# Patient Record
Sex: FEMALE | Race: WHITE | Employment: OTHER | URBAN - METROPOLITAN AREA
[De-identification: names, ages, dates, MRNs, and addresses within clinical notes are randomized per-mention and may not be internally consistent; named-entity substitution may affect disease eponyms.]

---

## 2017-12-10 PROBLEM — I21.4 MI, ACUTE, NON ST SEGMENT ELEVATION (HCC): Status: ACTIVE | Noted: 2017-12-10

## 2017-12-11 ENCOUNTER — HOSPITAL ENCOUNTER (INPATIENT)
Age: 82
LOS: 9 days | Discharge: NURSING, REHAB OR LONG-TERM CARE | DRG: 233 | End: 2017-12-20
Attending: INTERNAL MEDICINE | Admitting: INTERNAL MEDICINE
Payer: MEDICARE

## 2017-12-11 PROBLEM — E78.00 PURE HYPERCHOLESTEROLEMIA: Chronic | Status: ACTIVE | Noted: 2017-12-11

## 2017-12-11 PROBLEM — R07.9 CHEST PAIN: Status: ACTIVE | Noted: 2017-12-11

## 2017-12-11 LAB
ANION GAP SERPL CALCULATED.3IONS-SCNC: 12 MMOL/L (ref 9–17)
BUN BLDV-MCNC: 12 MG/DL (ref 8–23)
BUN/CREAT BLD: ABNORMAL (ref 9–20)
CALCIUM SERPL-MCNC: 8.5 MG/DL (ref 8.6–10.4)
CHLORIDE BLD-SCNC: 103 MMOL/L (ref 98–107)
CO2: 24 MMOL/L (ref 20–31)
CREAT SERPL-MCNC: 0.83 MG/DL (ref 0.5–0.9)
EKG ATRIAL RATE: 72 BPM
EKG P-R INTERVAL: 222 MS
EKG Q-T INTERVAL: 412 MS
EKG QRS DURATION: 50 MS
EKG QTC CALCULATION (BAZETT): 451 MS
EKG R AXIS: -9 DEGREES
EKG T AXIS: -160 DEGREES
EKG VENTRICULAR RATE: 72 BPM
GFR AFRICAN AMERICAN: >60 ML/MIN
GFR NON-AFRICAN AMERICAN: >60 ML/MIN
GFR SERPL CREATININE-BSD FRML MDRD: ABNORMAL ML/MIN/{1.73_M2}
GFR SERPL CREATININE-BSD FRML MDRD: ABNORMAL ML/MIN/{1.73_M2}
GLUCOSE BLD-MCNC: 118 MG/DL (ref 70–99)
HCT VFR BLD CALC: 44.2 % (ref 36.3–47.1)
HEMOGLOBIN: 13.9 G/DL (ref 11.9–15.1)
MAGNESIUM: 2.3 MG/DL (ref 1.6–2.6)
MCH RBC QN AUTO: 31.4 PG (ref 25.2–33.5)
MCHC RBC AUTO-ENTMCNC: 31.4 G/DL (ref 28.4–34.8)
MCV RBC AUTO: 100 FL (ref 82.6–102.9)
PARTIAL THROMBOPLASTIN TIME: 41.3 SEC (ref 21.3–31.3)
PARTIAL THROMBOPLASTIN TIME: 48.8 SEC (ref 21.3–31.3)
PARTIAL THROMBOPLASTIN TIME: 52.4 SEC (ref 21.3–31.3)
PARTIAL THROMBOPLASTIN TIME: 70.7 SEC (ref 21.3–31.3)
PDW BLD-RTO: 13 % (ref 11.8–14.4)
PLATELET # BLD: 176 K/UL (ref 138–453)
PMV BLD AUTO: 11.1 FL (ref 8.1–13.5)
POTASSIUM SERPL-SCNC: 5.3 MMOL/L (ref 3.7–5.3)
RBC # BLD: 4.42 M/UL (ref 3.95–5.11)
SODIUM BLD-SCNC: 139 MMOL/L (ref 135–144)
TROPONIN INTERP: ABNORMAL
TROPONIN INTERP: ABNORMAL
TROPONIN T: 4.25 NG/ML
TROPONIN T: 5.21 NG/ML
WBC # BLD: 9.1 K/UL (ref 3.5–11.3)

## 2017-12-11 PROCEDURE — 2500000003 HC RX 250 WO HCPCS: Performed by: NURSE PRACTITIONER

## 2017-12-11 PROCEDURE — 85027 COMPLETE CBC AUTOMATED: CPT

## 2017-12-11 PROCEDURE — 85730 THROMBOPLASTIN TIME PARTIAL: CPT

## 2017-12-11 PROCEDURE — 6360000002 HC RX W HCPCS: Performed by: NURSE PRACTITIONER

## 2017-12-11 PROCEDURE — 83735 ASSAY OF MAGNESIUM: CPT

## 2017-12-11 PROCEDURE — 94762 N-INVAS EAR/PLS OXIMTRY CONT: CPT

## 2017-12-11 PROCEDURE — 99223 1ST HOSP IP/OBS HIGH 75: CPT | Performed by: INTERNAL MEDICINE

## 2017-12-11 PROCEDURE — 6370000000 HC RX 637 (ALT 250 FOR IP): Performed by: NURSE PRACTITIONER

## 2017-12-11 PROCEDURE — 93005 ELECTROCARDIOGRAM TRACING: CPT

## 2017-12-11 PROCEDURE — 2060000000 HC ICU INTERMEDIATE R&B

## 2017-12-11 PROCEDURE — 36415 COLL VENOUS BLD VENIPUNCTURE: CPT

## 2017-12-11 PROCEDURE — 80048 BASIC METABOLIC PNL TOTAL CA: CPT

## 2017-12-11 PROCEDURE — 36620 INSERTION CATHETER ARTERY: CPT

## 2017-12-11 PROCEDURE — 84484 ASSAY OF TROPONIN QUANT: CPT

## 2017-12-11 RX ORDER — MAGNESIUM SULFATE 1 G/100ML
1 INJECTION INTRAVENOUS PRN
Status: DISCONTINUED | OUTPATIENT
Start: 2017-12-11 | End: 2017-12-13

## 2017-12-11 RX ORDER — DEXTROSE, SODIUM CHLORIDE, AND POTASSIUM CHLORIDE 5; .45; .15 G/100ML; G/100ML; G/100ML
INJECTION INTRAVENOUS CONTINUOUS
Status: DISCONTINUED | OUTPATIENT
Start: 2017-12-11 | End: 2017-12-13

## 2017-12-11 RX ORDER — NITROGLYCERIN 20 MG/100ML
5 INJECTION INTRAVENOUS CONTINUOUS
Status: DISCONTINUED | OUTPATIENT
Start: 2017-12-11 | End: 2017-12-13

## 2017-12-11 RX ORDER — ACETAMINOPHEN 325 MG/1
650 TABLET ORAL EVERY 4 HOURS PRN
Status: DISCONTINUED | OUTPATIENT
Start: 2017-12-11 | End: 2017-12-12

## 2017-12-11 RX ORDER — SODIUM CHLORIDE 0.9 % (FLUSH) 0.9 %
10 SYRINGE (ML) INJECTION EVERY 12 HOURS SCHEDULED
Status: DISCONTINUED | OUTPATIENT
Start: 2017-12-11 | End: 2017-12-13

## 2017-12-11 RX ORDER — FAMOTIDINE 20 MG/1
20 TABLET, FILM COATED ORAL 2 TIMES DAILY
Status: DISCONTINUED | OUTPATIENT
Start: 2017-12-11 | End: 2017-12-13

## 2017-12-11 RX ORDER — ASPIRIN 81 MG/1
324 TABLET, CHEWABLE ORAL ONCE
Status: DISCONTINUED | OUTPATIENT
Start: 2017-12-11 | End: 2017-12-12

## 2017-12-11 RX ORDER — MORPHINE SULFATE 2 MG/ML
2 INJECTION, SOLUTION INTRAMUSCULAR; INTRAVENOUS
Status: DISCONTINUED | OUTPATIENT
Start: 2017-12-11 | End: 2017-12-13

## 2017-12-11 RX ORDER — POTASSIUM CHLORIDE 20MEQ/15ML
40 LIQUID (ML) ORAL PRN
Status: DISCONTINUED | OUTPATIENT
Start: 2017-12-11 | End: 2017-12-13

## 2017-12-11 RX ORDER — HEPARIN SODIUM 10000 [USP'U]/100ML
12 INJECTION, SOLUTION INTRAVENOUS CONTINUOUS
Status: DISCONTINUED | OUTPATIENT
Start: 2017-12-11 | End: 2017-12-13

## 2017-12-11 RX ORDER — POTASSIUM CHLORIDE 7.45 MG/ML
10 INJECTION INTRAVENOUS PRN
Status: DISCONTINUED | OUTPATIENT
Start: 2017-12-11 | End: 2017-12-13

## 2017-12-11 RX ORDER — HYDROCODONE BITARTRATE AND ACETAMINOPHEN 5; 325 MG/1; MG/1
1 TABLET ORAL EVERY 4 HOURS PRN
Status: DISCONTINUED | OUTPATIENT
Start: 2017-12-11 | End: 2017-12-13

## 2017-12-11 RX ORDER — NITROGLYCERIN 0.4 MG/1
0.4 TABLET SUBLINGUAL EVERY 5 MIN PRN
Status: DISCONTINUED | OUTPATIENT
Start: 2017-12-11 | End: 2017-12-13

## 2017-12-11 RX ORDER — SODIUM CHLORIDE 0.9 % (FLUSH) 0.9 %
10 SYRINGE (ML) INJECTION PRN
Status: DISCONTINUED | OUTPATIENT
Start: 2017-12-11 | End: 2017-12-13

## 2017-12-11 RX ORDER — MORPHINE SULFATE 4 MG/ML
4 INJECTION, SOLUTION INTRAMUSCULAR; INTRAVENOUS
Status: DISCONTINUED | OUTPATIENT
Start: 2017-12-11 | End: 2017-12-13

## 2017-12-11 RX ORDER — HEPARIN SODIUM 1000 [USP'U]/ML
2000 INJECTION, SOLUTION INTRAVENOUS; SUBCUTANEOUS PRN
Status: DISCONTINUED | OUTPATIENT
Start: 2017-12-11 | End: 2017-12-13

## 2017-12-11 RX ORDER — HYDROCODONE BITARTRATE AND ACETAMINOPHEN 5; 325 MG/1; MG/1
2 TABLET ORAL EVERY 4 HOURS PRN
Status: DISCONTINUED | OUTPATIENT
Start: 2017-12-11 | End: 2017-12-13

## 2017-12-11 RX ORDER — ONDANSETRON 2 MG/ML
4 INJECTION INTRAMUSCULAR; INTRAVENOUS EVERY 6 HOURS PRN
Status: DISCONTINUED | OUTPATIENT
Start: 2017-12-11 | End: 2017-12-13

## 2017-12-11 RX ORDER — SIMVASTATIN 20 MG
20 TABLET ORAL NIGHTLY
Status: DISCONTINUED | OUTPATIENT
Start: 2017-12-11 | End: 2017-12-13

## 2017-12-11 RX ORDER — POTASSIUM CHLORIDE 20 MEQ/1
40 TABLET, EXTENDED RELEASE ORAL PRN
Status: DISCONTINUED | OUTPATIENT
Start: 2017-12-11 | End: 2017-12-13

## 2017-12-11 RX ORDER — HEPARIN SODIUM 1000 [USP'U]/ML
4000 INJECTION, SOLUTION INTRAVENOUS; SUBCUTANEOUS PRN
Status: DISCONTINUED | OUTPATIENT
Start: 2017-12-11 | End: 2017-12-13

## 2017-12-11 RX ADMIN — HEPARIN SODIUM 2000 UNITS: 1000 INJECTION, SOLUTION INTRAVENOUS; SUBCUTANEOUS at 11:05

## 2017-12-11 RX ADMIN — ACETAMINOPHEN 650 MG: 325 TABLET ORAL at 10:25

## 2017-12-11 RX ADMIN — NITROGLYCERIN 5 MCG/MIN: 20 INJECTION INTRAVENOUS at 09:44

## 2017-12-11 RX ADMIN — NITROGLYCERIN 2.5 MCG/MIN: 20 INJECTION INTRAVENOUS at 09:58

## 2017-12-11 RX ADMIN — HEPARIN SODIUM AND DEXTROSE 9 UNITS/KG/HR: 10000; 5 INJECTION INTRAVENOUS at 11:06

## 2017-12-11 RX ADMIN — SIMVASTATIN 20 MG: 20 TABLET, FILM COATED ORAL at 20:41

## 2017-12-11 RX ADMIN — METOPROLOL TARTRATE 25 MG: 25 TABLET ORAL at 20:41

## 2017-12-11 RX ADMIN — HEPARIN SODIUM 2000 UNITS: 1000 INJECTION, SOLUTION INTRAVENOUS; SUBCUTANEOUS at 18:11

## 2017-12-11 RX ADMIN — HEPARIN SODIUM AND DEXTROSE 11 UNITS/KG/HR: 10000; 5 INJECTION INTRAVENOUS at 18:08

## 2017-12-11 RX ADMIN — FAMOTIDINE 20 MG: 20 TABLET, FILM COATED ORAL at 09:44

## 2017-12-11 RX ADMIN — FAMOTIDINE 20 MG: 20 TABLET, FILM COATED ORAL at 20:41

## 2017-12-11 RX ADMIN — ACETAMINOPHEN 650 MG: 325 TABLET ORAL at 05:45

## 2017-12-11 ASSESSMENT — PAIN SCALES - GENERAL
PAINLEVEL_OUTOF10: 0
PAINLEVEL_OUTOF10: 0
PAINLEVEL_OUTOF10: 7
PAINLEVEL_OUTOF10: 0
PAINLEVEL_OUTOF10: 6

## 2017-12-11 NOTE — H&P
drug history on file. Family History:     History reviewed. No pertinent family history. Review of Systems:     Positive and Negative as described in HPI. CONSTITUTIONAL:  negative for fevers, chills, sweats, fatigue, weight loss  HEENT:  negative for vision, hearing changes, runny nose, throat pain  RESPIRATORY:  negative for shortness of breath, cough, congestion, wheezing. CARDIOVASCULAR:  negative for palpitations or pedal edema. GASTROINTESTINAL:  negative for nausea, vomiting, diarrhea, constipation, change in bowel habits, abdominal pain   GENITOURINARY:  negative for difficulty of urination, burning with urination, frequency   INTEGUMENT:  negative for rash, skin lesions, easy bruising   HEMATOLOGIC/LYMPHATIC:  negative for swelling/edema   ALLERGIC/IMMUNOLOGIC:  negative for urticaria , itching  ENDOCRINE:  negative increase in drinking, increase in urination, hot or cold intolerance  MUSCULOSKELETAL:  negative joint pains, muscle aches, swelling of joints  NEUROLOGICAL:  negative for headaches, dizziness, lightheadedness, numbness, pain, tingling extremities  BEHAVIOR/PSYCH:  negative for depression, anxiety    Physical Exam:   BP (!) 103/54   Pulse 74   Temp 97.9 °F (36.6 °C) (Oral)   Resp 16   Ht 5' (1.524 m)   Wt 182 lb 15.7 oz (83 kg)   SpO2 96%   BMI 35.74 kg/m²   Temp (24hrs), Av.6 °F (36.4 °C), Min:97.3 °F (36.3 °C), Max:97.9 °F (36.6 °C)    No results for input(s): POCGLU in the last 72 hours. Intake/Output Summary (Last 24 hours) at 17 1222  Last data filed at 17 0630   Gross per 24 hour   Intake              135 ml   Output              200 ml   Net              -65 ml       General Appearance:  alert, well appearing, and in no acute distress  Mental status: oriented to person, place, and time with normal affect  Head:  normocephalic, atraumatic.   Eye: no icterus, redness, pupils equal and reactive, extraocular eye movements intact, conjunctiva clear  Ear: Comment          GFR Staging NOT REPORTED    Magnesium    Collection Time: 12/11/17  1:54 AM   Result Value Ref Range    Magnesium 2.3 1.6 - 2.6 mg/dL   APTT    Collection Time: 12/11/17  7:07 AM   Result Value Ref Range    PTT 48.8 (H) 21.3 - 31.3 sec   Troponin    Collection Time: 12/11/17  7:07 AM   Result Value Ref Range    Troponin T 4.25 (HH) <0.03 ng/mL    Troponin Interp         EKG 12 Lead    Collection Time: 12/11/17 11:54 AM   Result Value Ref Range    Ventricular Rate 72 BPM    Atrial Rate 72 BPM    P-R Interval 222 ms    QRS Duration 50 ms    Q-T Interval 412 ms    QTc Calculation (Bazett) 451 ms    R Axis -9 degrees    T Axis -160 degrees       Assessment :      Primary Problem  MI, acute, non ST segment elevation [facility])    Active Hospital Problems    Diagnosis Date Noted    Chest pain [R07.9] 12/11/2017    Pure hypercholesterolemia [E78.00] 12/11/2017    MI, acute, non ST segment elevation ([facility] [address] [I21.4] 12/10/2017       Plan:     Patient status Admit as inpatient in the  Cardiovascular ICU    1. Keep NPO. 2. Continue Heparin ggt. Nitro as needed for chest pain. 3. Get rpt EKG. On monitor has T wave inversions and occasional PVCs. 4. A/w cardiology eval. Nurse to verify if [facility] received consult last night. 5. Labs to evaluate her other risk factors. Consultations:   IP CONSULT TO CARDIOLOGY     Patient is admitted as inpatient status because of co-morbidities listed above, severity of signs and symptoms as outlined, requirement for current medical therapies and most importantly because of direct risk to patient if care not provided in a hospital setting.     Eliana Sosa MD  12/11/2017  12:22 PM    Copy sent to Dr. Kingsley Arriaga DO

## 2017-12-11 NOTE — CONSULTS
Cardiology Consult           Date of Admission:  12/11/2017  Date of Consultation:  12/11/2017      PCP:  Johnny Watson DO      Chief Complaint: chest pain    History of Present Illness:  Sushant Calvin is a [de-identified] y.o. female who presents with chest pain and positive troponin. EKG initially concerning for STEMI and hyperacute T waves. Patient states that pain began after grocery shopping and she was carrying in her groceries and she noticed substernal pain with radiation across her back. She initially went to [facility] and was found to have positive trops. She then was transferred here to [facility] for further management and has been pain free since being here. Work up at [facility] included a PE protocol CT which was negative. PMH:   has no past medical history on file. PSH:   has a past surgical history that includes joint replacement.     Allergies:  No Known Allergies     Home Meds:    Prior to Admission medications    Not on [address]:    Current Facility-Administered Medications   Medication Dose Route Frequency Provider Last Rate Last Dose    aspirin chewable tablet 324 mg  324 mg Oral Once Robe Jj NP        Luh Ear ON 12/12/2017] aspirin EC tablet 325 mg  325 mg Oral Daily Robe jJ NP        heparin (porcine) injection 2,000 Units  2,000 Units Intravenous PRN Robe Jj NP   2,000 Units at 12/11/17 1105    heparin (porcine) injection 4,000 Units  4,000 Units Intravenous PRN Robe Jj NP        heparin 25,000 units in dextrose 5% 250 mL infusion  12 Units/kg/hr Intravenous Continuous Robe Jj NP 7.5 mL/hr at 12/11/17 1106 9 Units/kg/hr at 12/11/17 1106    metoprolol tartrate (LOPRESSOR) tablet 25 mg  25 mg Oral BID Robe Jj NP        nitroGLYCERIN (NITROSTAT) SL tablet 0.4 mg  0.4 mg Sublingual Q5 Min PRN Robe Jj NP        simvastatin (ZOCOR) tablet 20 mg  20 mg Oral Nightly Pippa Canales NP        acetaminophen (TYLENOL) tablet 650 mg  650 mg Oral Q4H PRN Pippa Canales NP   650 mg at 12/11/17 1025    dextrose 5 % and 0.45 % NaCl with KCl 20 mEq infusion   Intravenous Continuous Pippa Canales NP   Stopped at 12/11/17 0433    famotidine (PEPCID) tablet 20 mg  20 mg Oral BID Pippa Canales NP   20 mg at 12/11/17 0944    HYDROcodone-acetaminophen (NORCO) 5-325 MG per tablet 1 tablet  1 tablet Oral Q4H PRN Pippa Canales NP        Or    HYDROcodone-acetaminophen (NORCO) 5-325 MG per tablet 2 tablet  2 tablet Oral Q4H PRN Pippa Canales NP        magnesium hydroxide (MILK OF MAGNESIA) 400 MG/5ML suspension 30 mL  30 mL Oral Daily PRN Pippa Canales NP        magnesium sulfate 1 g in dextrose 5% 100 mL IVPB  1 g Intravenous PRN Pippa Canales NP        morphine injection 2 mg  2 mg Intravenous Q2H PRN Pippa Canales NP        Or   Douglas Jacobs morphine (PF) injection 4 mg  4 mg Intravenous Q2H PRN Pippa Canales NP        [facility]ron [facility]) injection 4 mg  4 mg Intravenous Q6H PRN Pippa Canales NP        potassium chloride (KLOR-CON M) extended release tablet 40 mEq  40 mEq Oral PRN Pippa Canales NP        Or    potassium chloride 20 MEQ/15ML (10%) oral solution 40 mEq  40 mEq Oral PRN Pippa Canales NP        Or    potassium chloride 10 mEq/100 mL IVPB (Peripheral Line)  10 mEq Intravenous PRN Pippa Canales NP        sodium chloride flush 0.9 % injection 10 mL  10 mL Intravenous 2 times per day Pippa Canales NP        sodium chloride flush 0.9 % injection 10 mL  10 mL Intravenous PRN HOWIE Wesley Crome ON 12/12/2017] pneumococcal 13-valent conjugate (PREVNAR) injection 0.5 mL  0.5 mL Intramuscular Once Kushal Qureshi MD        [START ON 12/12/2017] influenza quadrivalent split vaccine (1.524 m)   Wt 182 lb 15.7 oz (83 kg)   SpO2 96%   BMI 35.74 kg/m²        Admission Weight: 182 lb 15.7 oz (83 kg)     General appearance: Awake, Alert Cooperative    Head: Normocephalic, without obvious abnormality, atraumatic    Eyes: Conjunctivae/corneas clear. PERRL, EOM's intact. Fundi benign    Neck: no adenopathy, no carotid bruit, no JVD, supple, symmetrical, trachea midline and thyroid: not enlarged, symmetric, no tenderness/mass/nodules    Lungs: clear to auscultation bilaterally    Heart: regular rate and rhythm, S1, S2 normal, no murmur, click, rub or gallop    Abdomen: Soft, non-tender. Bowel sounds normal. No masses,  no organomegaly    Extremities: extremities normal, atraumatic, no cyanosis or edema    Skin: Skin color, texture, turgor normal. No rashes or lesions    Neurologic: Grossly normal            Labs:      CBC:   Recent Labs      12/11/17 0154   WBC  9.1   HGB  13.9   HCT  44.2   MCV  100.0   PLT  176     BMP:   Recent Labs      12/11/17   0154   NA  139   K  5.3   CL  103   CO2  24   BUN  12   CREATININE  0.83     PT/INR: No results for input(s): PROTIME, INR in the last 72 hours. APTT:   Recent Labs      12/11/17   0154  12/11/17   0707   APTT  52.4*  48.8*     MAG:   Recent Labs      12/11/17   0154   MG  2.3     D Dimer: No results for input(s): DDIMER in the last 72 hours. Troponin T   Recent Labs      12/11/17   0154  12/11/17   0707   TROPONINT  5.21*  4.25*     ProBNP Invalid input(s): PRO-BNP          Diagnosis:  Principal Problem:    MI, acute, non ST segment elevation (HCC)  Active Problems:    Chest pain    Pure hypercholesterolemia          Plan: Will plan for heart catheterization, r/b/a discussed and wishes to proceed. Will continue medical management overnight and cath tomorrow given recent contrast load with CT.       Electronically signed by Tom Kumar MD on 12/11/2017 at 1:27 PM

## 2017-12-11 NOTE — PROGRESS NOTES
Smoking Cessation - topics covered   []  Health Risks  []  Benefits of Quitting   []  Smoking Cessation  [x]  Patient has no history of tobacco use  []  Patient is former smoker. [x]  No need for tobacco cessation education. []  Booklet given  []  Patient verbalizes understanding. []  Patient denies need for tobacco cessation education.   Issac Cheema  11:09 AM

## 2017-12-12 ENCOUNTER — APPOINTMENT (OUTPATIENT)
Dept: CARDIAC CATH/INVASIVE PROCEDURES | Age: 82
DRG: 233 | End: 2017-12-12
Attending: INTERNAL MEDICINE
Payer: MEDICARE

## 2017-12-12 PROBLEM — R07.9 CHEST PAIN: Status: RESOLVED | Noted: 2017-12-11 | Resolved: 2017-12-12

## 2017-12-12 PROBLEM — R41.0 INTERMITTENT CONFUSION: Status: ACTIVE | Noted: 2017-12-12

## 2017-12-12 LAB
ABSOLUTE EOS #: 0.08 K/UL (ref 0–0.44)
ABSOLUTE IMMATURE GRANULOCYTE: <0.03 K/UL (ref 0–0.3)
ABSOLUTE LYMPH #: 1.25 K/UL (ref 1.1–3.7)
ABSOLUTE MONO #: 0.74 K/UL (ref 0.1–1.2)
ALBUMIN SERPL-MCNC: 3.6 G/DL (ref 3.5–5.2)
ALBUMIN/GLOBULIN RATIO: 1.3 (ref 1–2.5)
ALP BLD-CCNC: 50 U/L (ref 35–104)
ALT SERPL-CCNC: 22 U/L (ref 5–33)
ANION GAP SERPL CALCULATED.3IONS-SCNC: 10 MMOL/L (ref 9–17)
AST SERPL-CCNC: 77 U/L
BASOPHILS # BLD: 1 % (ref 0–2)
BASOPHILS ABSOLUTE: 0.04 K/UL (ref 0–0.2)
BILIRUB SERPL-MCNC: 0.5 MG/DL (ref 0.3–1.2)
BLOOD BANK SPECIMEN: NORMAL
BNP INTERPRETATION: ABNORMAL
BUN BLDV-MCNC: 9 MG/DL (ref 8–23)
BUN/CREAT BLD: ABNORMAL (ref 9–20)
CALCIUM SERPL-MCNC: 8.4 MG/DL (ref 8.6–10.4)
CHLORIDE BLD-SCNC: 106 MMOL/L (ref 98–107)
CHOLESTEROL/HDL RATIO: 2.5
CHOLESTEROL: 196 MG/DL
CO2: 26 MMOL/L (ref 20–31)
CREAT SERPL-MCNC: 0.77 MG/DL (ref 0.5–0.9)
DIFFERENTIAL TYPE: ABNORMAL
EOSINOPHILS RELATIVE PERCENT: 1 % (ref 1–4)
ESTIMATED AVERAGE GLUCOSE: 117 MG/DL
GFR AFRICAN AMERICAN: >60 ML/MIN
GFR NON-AFRICAN AMERICAN: >60 ML/MIN
GFR SERPL CREATININE-BSD FRML MDRD: ABNORMAL ML/MIN/{1.73_M2}
GFR SERPL CREATININE-BSD FRML MDRD: ABNORMAL ML/MIN/{1.73_M2}
GLUCOSE BLD-MCNC: 125 MG/DL (ref 70–99)
HBA1C MFR BLD: 5.7 % (ref 4–6)
HCT VFR BLD CALC: 43.9 % (ref 36.3–47.1)
HDLC SERPL-MCNC: 80 MG/DL
HEMOGLOBIN: 14.4 G/DL (ref 11.9–15.1)
IMMATURE GRANULOCYTES: 0 %
INR BLD: 1
LDL CHOLESTEROL: 103 MG/DL (ref 0–130)
LV EF: 35 %
LVEF MODALITY: NORMAL
LYMPHOCYTES # BLD: 14 % (ref 24–43)
MCH RBC QN AUTO: 31.9 PG (ref 25.2–33.5)
MCHC RBC AUTO-ENTMCNC: 32.8 G/DL (ref 28.4–34.8)
MCV RBC AUTO: 97.3 FL (ref 82.6–102.9)
MONOCYTES # BLD: 8 % (ref 3–12)
PARTIAL THROMBOPLASTIN TIME: 24.7 SEC (ref 21.3–31.3)
PARTIAL THROMBOPLASTIN TIME: 61.9 SEC (ref 21.3–31.3)
PDW BLD-RTO: 13.2 % (ref 11.8–14.4)
PLATELET # BLD: 164 K/UL (ref 138–453)
PLATELET ESTIMATE: ABNORMAL
PMV BLD AUTO: 11.6 FL (ref 8.1–13.5)
POTASSIUM SERPL-SCNC: 4.7 MMOL/L (ref 3.7–5.3)
PRO-BNP: 5915 PG/ML
PROTHROMBIN TIME: 10.5 SEC (ref 9.4–12.6)
RBC # BLD: 4.51 M/UL (ref 3.95–5.11)
RBC # BLD: ABNORMAL 10*6/UL
SEG NEUTROPHILS: 76 % (ref 36–65)
SEGMENTED NEUTROPHILS ABSOLUTE COUNT: 6.71 K/UL (ref 1.5–8.1)
SODIUM BLD-SCNC: 142 MMOL/L (ref 135–144)
TOTAL PROTEIN: 6.3 G/DL (ref 6.4–8.3)
TRIGL SERPL-MCNC: 63 MG/DL
VLDLC SERPL CALC-MCNC: NORMAL MG/DL (ref 1–30)
WBC # BLD: 8.8 K/UL (ref 3.5–11.3)
WBC # BLD: ABNORMAL 10*3/UL

## 2017-12-12 PROCEDURE — C1725 CATH, TRANSLUMIN NON-LASER: HCPCS

## 2017-12-12 PROCEDURE — 87641 MR-STAPH DNA AMP PROBE: CPT

## 2017-12-12 PROCEDURE — 36415 COLL VENOUS BLD VENIPUNCTURE: CPT

## 2017-12-12 PROCEDURE — B2151ZZ FLUOROSCOPY OF LEFT HEART USING LOW OSMOLAR CONTRAST: ICD-10-PCS | Performed by: INTERNAL MEDICINE

## 2017-12-12 PROCEDURE — 80061 LIPID PANEL: CPT

## 2017-12-12 PROCEDURE — 93880 EXTRACRANIAL BILAT STUDY: CPT

## 2017-12-12 PROCEDURE — 2580000003 HC RX 258: Performed by: NURSE PRACTITIONER

## 2017-12-12 PROCEDURE — B2111ZZ FLUOROSCOPY OF MULTIPLE CORONARY ARTERIES USING LOW OSMOLAR CONTRAST: ICD-10-PCS | Performed by: INTERNAL MEDICINE

## 2017-12-12 PROCEDURE — C1760 CLOSURE DEV, VASC: HCPCS

## 2017-12-12 PROCEDURE — 6370000000 HC RX 637 (ALT 250 FOR IP): Performed by: NURSE PRACTITIONER

## 2017-12-12 PROCEDURE — 85730 THROMBOPLASTIN TIME PARTIAL: CPT

## 2017-12-12 PROCEDURE — 86901 BLOOD TYPING SEROLOGIC RH(D): CPT

## 2017-12-12 PROCEDURE — 86920 COMPATIBILITY TEST SPIN: CPT

## 2017-12-12 PROCEDURE — 6370000000 HC RX 637 (ALT 250 FOR IP): Performed by: INTERNAL MEDICINE

## 2017-12-12 PROCEDURE — 93306 TTE W/DOPPLER COMPLETE: CPT

## 2017-12-12 PROCEDURE — 94762 N-INVAS EAR/PLS OXIMTRY CONT: CPT

## 2017-12-12 PROCEDURE — C1894 INTRO/SHEATH, NON-LASER: HCPCS

## 2017-12-12 PROCEDURE — 85025 COMPLETE CBC W/AUTO DIFF WBC: CPT

## 2017-12-12 PROCEDURE — 80053 COMPREHEN METABOLIC PANEL: CPT

## 2017-12-12 PROCEDURE — 6360000002 HC RX W HCPCS: Performed by: NURSE PRACTITIONER

## 2017-12-12 PROCEDURE — 93970 EXTREMITY STUDY: CPT

## 2017-12-12 PROCEDURE — 2060000000 HC ICU INTERMEDIATE R&B

## 2017-12-12 PROCEDURE — 99222 1ST HOSP IP/OBS MODERATE 55: CPT | Performed by: INTERNAL MEDICINE

## 2017-12-12 PROCEDURE — 86900 BLOOD TYPING SEROLOGIC ABO: CPT

## 2017-12-12 PROCEDURE — 86850 RBC ANTIBODY SCREEN: CPT

## 2017-12-12 PROCEDURE — 6370000000 HC RX 637 (ALT 250 FOR IP): Performed by: PHYSICIAN ASSISTANT

## 2017-12-12 PROCEDURE — 4A023N7 MEASUREMENT OF CARDIAC SAMPLING AND PRESSURE, LEFT HEART, PERCUTANEOUS APPROACH: ICD-10-PCS | Performed by: INTERNAL MEDICINE

## 2017-12-12 PROCEDURE — C1769 GUIDE WIRE: HCPCS

## 2017-12-12 PROCEDURE — 83880 ASSAY OF NATRIURETIC PEPTIDE: CPT

## 2017-12-12 PROCEDURE — 83036 HEMOGLOBIN GLYCOSYLATED A1C: CPT

## 2017-12-12 PROCEDURE — 93458 L HRT ARTERY/VENTRICLE ANGIO: CPT | Performed by: INTERNAL MEDICINE

## 2017-12-12 PROCEDURE — 85610 PROTHROMBIN TIME: CPT

## 2017-12-12 RX ORDER — ASPIRIN 81 MG/1
81 TABLET ORAL DAILY
Status: DISCONTINUED | OUTPATIENT
Start: 2017-12-13 | End: 2017-12-13

## 2017-12-12 RX ORDER — SODIUM CHLORIDE 450 MG/100ML
INJECTION, SOLUTION INTRAVENOUS CONTINUOUS
Status: ACTIVE | OUTPATIENT
Start: 2017-12-12 | End: 2017-12-12

## 2017-12-12 RX ORDER — ACETAMINOPHEN 325 MG/1
650 TABLET ORAL EVERY 4 HOURS PRN
Status: DISCONTINUED | OUTPATIENT
Start: 2017-12-12 | End: 2017-12-13

## 2017-12-12 RX ORDER — SODIUM CHLORIDE 0.9 % (FLUSH) 0.9 %
10 SYRINGE (ML) INJECTION PRN
Status: DISCONTINUED | OUTPATIENT
Start: 2017-12-12 | End: 2017-12-12

## 2017-12-12 RX ORDER — QUETIAPINE FUMARATE 25 MG/1
25 TABLET, FILM COATED ORAL NIGHTLY
Status: DISCONTINUED | OUTPATIENT
Start: 2017-12-12 | End: 2017-12-13

## 2017-12-12 RX ORDER — ONDANSETRON 2 MG/ML
4 INJECTION INTRAMUSCULAR; INTRAVENOUS EVERY 6 HOURS PRN
Status: DISCONTINUED | OUTPATIENT
Start: 2017-12-12 | End: 2017-12-12

## 2017-12-12 RX ORDER — SODIUM CHLORIDE 0.9 % (FLUSH) 0.9 %
10 SYRINGE (ML) INJECTION EVERY 12 HOURS SCHEDULED
Status: DISCONTINUED | OUTPATIENT
Start: 2017-12-12 | End: 2017-12-13

## 2017-12-12 RX ADMIN — QUETIAPINE FUMARATE 25 MG: 25 TABLET ORAL at 20:35

## 2017-12-12 RX ADMIN — Medication 10 ML: at 20:36

## 2017-12-12 RX ADMIN — SIMVASTATIN 20 MG: 20 TABLET, FILM COATED ORAL at 20:35

## 2017-12-12 RX ADMIN — ACETAMINOPHEN 650 MG: 325 TABLET ORAL at 15:10

## 2017-12-12 RX ADMIN — HEPARIN SODIUM AND DEXTROSE 11 UNITS/KG/HR: 10000; 5 INJECTION INTRAVENOUS at 00:09

## 2017-12-12 RX ADMIN — FAMOTIDINE 20 MG: 20 TABLET, FILM COATED ORAL at 20:35

## 2017-12-12 RX ADMIN — METOPROLOL TARTRATE 25 MG: 25 TABLET ORAL at 20:35

## 2017-12-12 ASSESSMENT — ENCOUNTER SYMPTOMS
ABDOMINAL PAIN: 0
COLOR CHANGE: 0
SORE THROAT: 0
COUGH: 0
SHORTNESS OF BREATH: 0
CHEST TIGHTNESS: 0
VOMITING: 1
DIARRHEA: 1
BACK PAIN: 1
ABDOMINAL DISTENTION: 0

## 2017-12-12 ASSESSMENT — PAIN SCALES - GENERAL: PAINLEVEL_OUTOF10: 3

## 2017-12-12 NOTE — CONSULTS
Chief Complaint:  Chest pain on Sunday (12/10/17)    HPI:  Ms. Jeanna Worley is a [de-identified]y.o. year-old  female who was transferred from [facility] with elevated troponins and further workup yesterday. Pt denies any significant past medical history. Per pt she had substernal chest pain after returning from grocery shopping. It was nonradiating, lasting for few hours which made her go to [facility]. Associated with back pain, vomiting and diarrhea. Denies any diaphoresis, SOB, fatigue, dizziness syncope, palpitations. Denies any similar CP in the past. She was given morphine and nitro drip at [facility] with relief of symptoms. Had elevated trops and no ST elevation on EKG. Her CT chest was negative for PE per history. She also has c/o pedal edema on and off for long time for which she takes \"water pill\". She had cardiac cath today and was found to have MVD. She has been referred for consideration of CABG. PMH:   has a past medical history of NSTEMI (non-ST elevated myocardial infarction) ([facility] [address] (12/11/2017). denies HTN, HLP, DM, thyroid, kidney, liver issues. denies any h/o strokes, Mi, rib fractures, varicose veins, vein stripping, PAD  PSH:   has a past surgical history that includes joint replacement; Cardiac catheterization (12/12/2017); and Cataract removal (Bilateral).   Allergies:  No Known Allergies  Medications:    Current Facility-Administered Medications:     sodium chloride flush 0.9 % injection 10 mL, 10 mL, Intravenous, 2 times per day, Radha Padilla MD    acetaminophen (TYLENOL) tablet 650 mg, 650 mg, Oral, Q4H PRN, Radha Padilla MD, 650 mg at 12/12/17 1510    0.45 % sodium chloride infusion, , Intravenous, Continuous, Radha Padilla MD    aspirin chewable tablet 324 mg, 324 mg, Oral, Once, Robe Jj NP    aspirin EC tablet 325 mg, 325 mg, Oral, Daily, Robe Jj NP, Stopped at 12/12/17 0928    heparin (porcine) injection 2,000 Units, 2,000 Units, Intravenous, PRN, Hollie Wells NP, 2,000 Units at 12/11/17 1811    heparin (porcine) injection 4,000 Units, 4,000 Units, Intravenous, PRN, Hollie Wells NP    heparin 25,000 units in dextrose 5% 250 mL infusion, 12 Units/kg/hr, Intravenous, Continuous, Hollie Wells NP, Last Rate: 9.1 mL/hr at 12/12/17 0927, 11 Units/kg/hr at 12/12/17 0927    metoprolol tartrate (LOPRESSOR) tablet 25 mg, 25 mg, Oral, BID, Hollie Wells NP, Stopped at 12/12/17 9937    nitroGLYCERIN (NITROSTAT) SL tablet 0.4 mg, 0.4 mg, Sublingual, Q5 Min PRN, Hollie Wells NP    simvastatin (ZOCOR) tablet 20 mg, 20 mg, Oral, Nightly, Hollie Wells NP, 20 mg at 12/11/17 2041    dextrose 5 % and 0.45 % NaCl with KCl 20 mEq infusion, , Intravenous, Continuous, Hollie Minks, NP, Stopped at 12/11/17 0433    famotidine (PEPCID) tablet 20 mg, 20 mg, Oral, BID, Hollie Wells NP, Stopped at 12/12/17 0928    HYDROcodone-acetaminophen (NORCO) 5-325 MG per tablet 1 tablet, 1 tablet, Oral, Q4H PRN **OR** HYDROcodone-acetaminophen (NORCO) 5-325 MG per tablet 2 tablet, 2 tablet, Oral, Q4H PRN, Hollie Wells NP    magnesium hydroxide (MILK OF MAGNESIA) 400 MG/5ML suspension 30 mL, 30 mL, Oral, Daily PRN, Hollie Wells NP    magnesium sulfate 1 g in dextrose 5% 100 mL IVPB, 1 g, Intravenous, PRN, Hollie Wells NP    morphine injection 2 mg, 2 mg, Intravenous, Q2H PRN **OR** morphine (PF) injection 4 mg, 4 mg, Intravenous, Q2H PRN, Hollie Wells NP    ondansetron [facility]) injection 4 mg, 4 mg, Intravenous, Q6H PRN, Hollie Welsl NP    potassium chloride (KLOR-CON M) extended release tablet 40 mEq, 40 mEq, Oral, PRN **OR** potassium chloride 20 MEQ/15ML (10%) oral solution 40 mEq, 40 mEq, Oral, PRN **OR** potassium chloride 10 mEq/100 mL IVPB (Peripheral Line), 10 mEq, Intravenous, PRN, Hollie Wells NP    sodium chloride flush 0.9 % injection 10 mL, 10 mL, Intravenous, 2 times per day, Mami Rucker NP    sodium chloride flush 0.9 % injection 10 mL, 10 mL, Intravenous, PRN, Mami Rucker NP    pneumococcal 13-valent conjugate (PREVNAR) injection 0.5 mL, 0.5 mL, Intramuscular, Once, Cass Madden MD    influenza quadrivalent split vaccine (FLUZONE;FLUARIX;FLULAVAL;AFLURIA) injection 0.5 mL, 0.5 mL, Intramuscular, Once, Cass Madden MD    nitroGLYCERIN 50 mg in dextrose 5% 250 mL infusion, 5 mcg/min, Intravenous, Continuous, Mami Rucker NP, Stopped at 12/11/17 1057    Social Hx:     reports that she has never smoked. She does not have any smokeless tobacco history on file. no ETOH, drugs. Lives by self. Family Hx:  Mother- emphysema, few of her 6 siblings have \"heart problems because they smoke a lot\"    ROS:    Review of Systems   Constitutional: Negative for chills, diaphoresis, fatigue and fever. HENT: Negative for congestion and sore throat. Eyes: Negative for visual disturbance. Respiratory: Negative for cough, chest tightness and shortness of breath. Cardiovascular: Positive for chest pain and leg swelling. Negative for palpitations. Gastrointestinal: Positive for diarrhea and vomiting. Negative for abdominal distention and abdominal pain. Genitourinary: Negative for dysuria, frequency and urgency. Musculoskeletal: Positive for back pain. Skin: Negative for color change, pallor and rash. Neurological: Negative for dizziness, syncope and numbness. Psychiatric/Behavioral: Negative for behavioral problems and confusion. The patient is not nervous/anxious. Physical Examination    Vitals:  Vitals:    12/12/17 1500   BP: (!) 138/56   Pulse: 92   Resp: 18   Temp:    SpO2: 92%     Physical Exam   Constitutional: She is oriented to person, place, and time. She appears well-developed and well-nourished. No distress. HENT:   Head: Normocephalic and atraumatic. Eyes: EOM are normal. Pupils are equal, round, and reactive to light. Neck: Normal range of motion. Neck supple. No JVD present. No tracheal deviation present. Cardiovascular: Normal rate, regular rhythm and normal heart sounds. Exam reveals no gallop and no friction rub. No murmur heard. Pulmonary/Chest: Effort normal and breath sounds normal. No respiratory distress. She has no wheezes. She has no rales. Abdominal: Soft. Bowel sounds are normal. She exhibits no distension. There is no tenderness. Musculoskeletal: She exhibits edema. Neurological: She is alert and oriented to person, place, and time. Skin: Skin is warm and dry. She is not diaphoretic. No erythema. No pallor. Psychiatric: She has a normal mood and affect. Her behavior is normal. Judgment and thought content normal.       Imaging Studies: Cardiac workup has revealed   Cardiac Cath: 12/12/17  LMCA: 40-50% distal lesion    LAD: 90% ostial lesion  95% mid lesion  90% ostial diagonal lesion <2.0mm vessel    LCx: 50-60% mid lesion    RCA: Normal 0% stenosis. Ramus: 70-80% ostial Ramus lesion     Coronary Tree      Dominance: Right     LV Analysis  LV function assessed as:Abnormal.  Ejection Fraction  +----------------------------------------------------------------------+---+  ! Method                                                                !EF%! +----------------------------------------------------------------------+---+  ! LV gram                                                               !25 ! +----------------------------------------------------------------------+---+  Echo: pending      Assessment:  In summary, Ms. Maribel Marks is a [de-identified]y.o. year-old female with NSTEMI have MVD CAD, pt on heparin gtt with therapeutic PTT. Nitro gtt stopped, on BB and ASA, pt currently asymptomatic. Recommendation:  I believe Ms. Maribel Marks would benefit optimally from [facility] CABG    I have discussed the proposed procedure, along with its risk,

## 2017-12-12 NOTE — PLAN OF CARE
Problem: Pain:  Goal: Control of acute pain  Control of acute pain   Outcome: Ongoing  Pt had no complaint of chest pain this shift.

## 2017-12-12 NOTE — PROGRESS NOTES
Wendy Hartman [facility]    Progress Note    12/12/2017    8:50 AM    Name:   Brittni Ernst  MRN:     [id number]     Acct:      [de-identified]   Room:   [address] [place] [address] Day:  1  Admit Date:  12/11/2017  1:30 AM    PCP:   Sonia Salcido DO  Code Status:  Full Code    Subjective:     C/C: Chest pain . Interval History Status: improved. Per RN, overnight patient had trouble with orientation. She was apparently aware she was at [facility] but kept calling for her dog, trying to get out of bed. Has a sitter in room with her now. Remains free of chest pain/SOB/Cough. Brief History:   [de-identified] y.o. Non-/non  female with no significant PMH presents with chest pain that started yesterday after a trip to grocery store. Substernal with radiation over the entire chest 6-8/10 severe, 'pressure like' . Lasted for 4-5 hrs after which she decided to drive herself to [facility] .   CT chest showed no PE, +b/l mediastinal lymphadenopathy. Since Troponin was trending up she was transferred here this morning. Trop max >40. Trops trended down. Chest pain resolved 12/11 and off nitro drip. Continues to be on Heparin ggt . Cath today per cardiology. Review of Systems:     Constitutional:  negative for chills, fevers, sweats  Respiratory:  negative for cough, dyspnea on exertion, hemoptysis, shortness of breath, wheezing  Cardiovascular:  negative for chest pain, chest pressure/discomfort, lower extremity edema, palpitations  Gastrointestinal:  negative for abdominal pain, constipation, diarrhea, nausea, vomiting  Neurological:  negative for dizziness, headache.+confusion overnight. Medications:      Allergies:  No Known Allergies    Current Meds:   Scheduled Meds:    aspirin  324 mg Oral Once    aspirin  325 mg Oral Daily    metoprolol tartrate  25 mg Oral BID    simvastatin  20 mg Oral Nightly    famotidine  20 mg Oral BID    sodium chloride

## 2017-12-12 NOTE — PROGRESS NOTES
Patient admitted, consent signed and questions answered. Patient ready for procedure. Call light to reach with side rails up 2 of 2. Bilat groin hair clipped. Aid/sitter at bedside with patient. Awaiting daughter. Patient oriented x4.

## 2017-12-12 NOTE — FLOWSHEET NOTE
· Facts: Pt here reportedly b/c of chest pain & elevated troponins. Reportedly will go for cardiac cath tomorrow. · Feelings: Pt calm & experiencing no pain when  saw her while rounding. Pt said that she is ready for possibility of having stents placed. · Family: Pt is  and has grown children. She also has grandchildren and living siblings. She says that she was  about 2 yrs ago & since that time has lived alone. · Deandra: Pt describes herself as [facility] and a \"member\" of [employer]) in [address] [place] [address] said that she has not been very regular in attendance @ services since the death of her . Pt was open to 's presence and accepted his offer of prayer. · Follow-up: Chaplains will remain available to offer spiritual and emotional support as needed. Rev. Ramila Rae, [address]     12/11/17 5110   Encounter Summary   Services provided to: Patient   Referral/Consult From: [facility] Children;Family members   Place [facility] ([facility] [address] [facility])   Continue Visiting (12/11)   Complexity of Encounter Moderate   Length of Encounter 15 minutes   Spiritual Assessment Completed Yes   Routine   Type Initial  (pt will have cardiac cath tomorrow)   Assessment Calm; Approachable;Coping   Intervention Sustaining presence/ Ministry of presence; Active listening;Explored feelings, thoughts, concerns;Explored coping resources; Discussed belief system/Hindu practices/deandra;Prayer  (left Spiritual Care info)   Outcome Receptive; Acceptance;Encouraged;Coping;Engaged in conversation;Comfort;Expressed gratitude   Spiritual/Congregation   Type Spiritual support

## 2017-12-13 ENCOUNTER — APPOINTMENT (OUTPATIENT)
Dept: GENERAL RADIOLOGY | Age: 82
DRG: 233 | End: 2017-12-13
Attending: INTERNAL MEDICINE
Payer: MEDICARE

## 2017-12-13 ENCOUNTER — ANESTHESIA (OUTPATIENT)
Dept: OPERATING ROOM | Age: 82
DRG: 233 | End: 2017-12-13
Payer: MEDICARE

## 2017-12-13 ENCOUNTER — ANESTHESIA EVENT (OUTPATIENT)
Dept: OPERATING ROOM | Age: 82
DRG: 233 | End: 2017-12-13
Payer: MEDICARE

## 2017-12-13 VITALS
RESPIRATION RATE: 11 BRPM | OXYGEN SATURATION: 99 % | DIASTOLIC BLOOD PRESSURE: 72 MMHG | SYSTOLIC BLOOD PRESSURE: 115 MMHG | TEMPERATURE: 96 F

## 2017-12-13 PROBLEM — I25.10 CAD, MULTIPLE VESSEL: Status: ACTIVE | Noted: 2017-12-13

## 2017-12-13 LAB
-: NORMAL
ALLEN TEST: ABNORMAL
ALLEN TEST: NORMAL
ALLEN TEST: NORMAL
AMORPHOUS: NORMAL
ANION GAP SERPL CALCULATED.3IONS-SCNC: 13 MMOL/L (ref 9–17)
ANION GAP: 10 MMOL/L (ref 7–16)
BACTERIA: NORMAL
BILIRUBIN URINE: NEGATIVE
BUN BLDV-MCNC: 11 MG/DL (ref 8–23)
BUN/CREAT BLD: ABNORMAL (ref 9–20)
CALCIUM SERPL-MCNC: 8 MG/DL (ref 8.6–10.4)
CASTS UA: NORMAL /LPF (ref 0–8)
CHLORIDE BLD-SCNC: 110 MMOL/L (ref 98–107)
CO2: 24 MMOL/L (ref 20–31)
COLOR: YELLOW
COMMENT UA: ABNORMAL
CREAT SERPL-MCNC: 0.73 MG/DL (ref 0.5–0.9)
CRYSTALS, UA: NORMAL /HPF
CULTURE: NORMAL
EKG ATRIAL RATE: 78 BPM
EKG P AXIS: 66 DEGREES
EKG P-R INTERVAL: 200 MS
EKG Q-T INTERVAL: 402 MS
EKG QRS DURATION: 62 MS
EKG QTC CALCULATION (BAZETT): 458 MS
EKG R AXIS: 2 DEGREES
EKG T AXIS: 116 DEGREES
EKG VENTRICULAR RATE: 78 BPM
EPITHELIAL CELLS UA: NORMAL /HPF (ref 0–5)
FIO2: 60
FIO2: ABNORMAL
FIO2: NORMAL
GFR AFRICAN AMERICAN: >60 ML/MIN
GFR NON-AFRICAN AMERICAN: >60 ML/MIN
GFR NON-AFRICAN AMERICAN: >60 ML/MIN
GFR SERPL CREATININE-BSD FRML MDRD: >60 ML/MIN
GFR SERPL CREATININE-BSD FRML MDRD: ABNORMAL ML/MIN/{1.73_M2}
GFR SERPL CREATININE-BSD FRML MDRD: ABNORMAL ML/MIN/{1.73_M2}
GFR SERPL CREATININE-BSD FRML MDRD: NORMAL ML/MIN/{1.73_M2}
GLUCOSE BLD-MCNC: 111 MG/DL (ref 65–105)
GLUCOSE BLD-MCNC: 113 MG/DL (ref 65–105)
GLUCOSE BLD-MCNC: 128 MG/DL (ref 74–100)
GLUCOSE BLD-MCNC: 129 MG/DL (ref 70–99)
GLUCOSE BLD-MCNC: 137 MG/DL (ref 74–100)
GLUCOSE BLD-MCNC: 155 MG/DL (ref 74–100)
GLUCOSE BLD-MCNC: 190 MG/DL (ref 74–100)
GLUCOSE BLD-MCNC: 213 MG/DL (ref 74–100)
GLUCOSE BLD-MCNC: 86 MG/DL (ref 65–105)
GLUCOSE BLD-MCNC: 94 MG/DL (ref 74–100)
GLUCOSE URINE: NEGATIVE
HCT VFR BLD CALC: 22.6 % (ref 36.3–47.1)
HEMOGLOBIN: 7.3 G/DL (ref 11.9–15.1)
KETONES, URINE: NEGATIVE
LEUKOCYTE ESTERASE, URINE: ABNORMAL
Lab: NORMAL
MAGNESIUM: 1.4 MG/DL (ref 1.6–2.6)
MCH RBC QN AUTO: 32 PG (ref 25.2–33.5)
MCHC RBC AUTO-ENTMCNC: 32.3 G/DL (ref 28.4–34.8)
MCV RBC AUTO: 99.1 FL (ref 82.6–102.9)
MODE: ABNORMAL
MODE: NORMAL
MODE: NORMAL
MRSA, DNA, NASAL: NORMAL
MUCUS: NORMAL
NEGATIVE BASE EXCESS, ART: 1 (ref 0–2)
NEGATIVE BASE EXCESS, ART: 1 (ref 0–2)
NEGATIVE BASE EXCESS, ART: 6 (ref 0–2)
NEGATIVE BASE EXCESS, ART: 6 (ref 0–2)
NEGATIVE BASE EXCESS, ART: ABNORMAL (ref 0–2)
NEGATIVE BASE EXCESS, ART: NORMAL (ref 0–2)
NITRITE, URINE: NEGATIVE
O2 DEVICE/FLOW/%: ABNORMAL
O2 DEVICE/FLOW/%: NORMAL
O2 DEVICE/FLOW/%: NORMAL
OTHER OBSERVATIONS UA: NORMAL
PATIENT TEMP: ABNORMAL
PATIENT TEMP: NORMAL
PATIENT TEMP: NORMAL
PDW BLD-RTO: 13.1 % (ref 11.8–14.4)
PH UA: 6 (ref 5–8)
PLATELET # BLD: 143 K/UL (ref 138–453)
PLATELET # BLD: ABNORMAL K/UL (ref 138–453)
PLATELET, FLUORESCENCE: 66 K/UL (ref 138–453)
PLATELET, IMMATURE FRACTION: 5.7 % (ref 1.1–10.3)
PMV BLD AUTO: ABNORMAL FL (ref 8.1–13.5)
POC CHLORIDE: 109 MMOL/L (ref 98–107)
POC CREATININE: 0.66 MG/DL (ref 0.51–1.19)
POC HCO3: 20.3 MMOL/L (ref 21–28)
POC HCO3: 20.8 MMOL/L (ref 21–28)
POC HCO3: 22.6 MMOL/L (ref 21–28)
POC HCO3: 24.1 MMOL/L (ref 21–28)
POC HCO3: 24.6 MMOL/L (ref 21–28)
POC HCO3: 27.5 MMOL/L (ref 21–28)
POC HEMATOCRIT: 23 % (ref 36–46)
POC HEMATOCRIT: 24 % (ref 36–46)
POC HEMATOCRIT: 27 % (ref 36–46)
POC HEMATOCRIT: 28 % (ref 36–46)
POC HEMATOCRIT: 38 % (ref 36–46)
POC HEMOGLOBIN: 12.8 G/DL (ref 12–16)
POC HEMOGLOBIN: 7.8 G/DL (ref 12–16)
POC HEMOGLOBIN: 8.1 G/DL (ref 12–16)
POC HEMOGLOBIN: 9.2 G/DL (ref 12–16)
POC HEMOGLOBIN: 9.5 G/DL (ref 12–16)
POC IONIZED CALCIUM: 1.13 MMOL/L (ref 1.15–1.33)
POC IONIZED CALCIUM: 1.2 MMOL/L (ref 1.15–1.33)
POC IONIZED CALCIUM: 1.31 MMOL/L (ref 1.15–1.33)
POC IONIZED CALCIUM: 1.37 MMOL/L (ref 1.15–1.33)
POC IONIZED CALCIUM: 1.74 MMOL/L (ref 1.15–1.33)
POC LACTIC ACID: 4.68 MMOL/L (ref 0.56–1.39)
POC O2 SATURATION: 100 % (ref 94–98)
POC O2 SATURATION: 100 % (ref 94–98)
POC O2 SATURATION: 97 % (ref 94–98)
POC O2 SATURATION: 97 % (ref 94–98)
POC O2 SATURATION: 99 % (ref 94–98)
POC O2 SATURATION: 99 % (ref 94–98)
POC PCO2 TEMP: ABNORMAL MM HG
POC PCO2 TEMP: NORMAL MM HG
POC PCO2 TEMP: NORMAL MM HG
POC PCO2: 30.1 MM HG (ref 35–48)
POC PCO2: 40.3 MM HG (ref 35–48)
POC PCO2: 41.2 MM HG (ref 35–48)
POC PCO2: 42.7 MM HG (ref 35–48)
POC PCO2: 42.8 MM HG (ref 35–48)
POC PCO2: 43 MM HG (ref 35–48)
POC PH TEMP: ABNORMAL
POC PH TEMP: NORMAL
POC PH TEMP: NORMAL
POC PH: 7.29 (ref 7.35–7.45)
POC PH: 7.31 (ref 7.35–7.45)
POC PH: 7.37 (ref 7.35–7.45)
POC PH: 7.38 (ref 7.35–7.45)
POC PH: 7.42 (ref 7.35–7.45)
POC PH: 7.49 (ref 7.35–7.45)
POC PO2 TEMP: ABNORMAL MM HG
POC PO2 TEMP: NORMAL MM HG
POC PO2 TEMP: NORMAL MM HG
POC PO2: 136.8 MM HG (ref 83–108)
POC PO2: 149 MM HG (ref 83–108)
POC PO2: 222.6 MM HG (ref 83–108)
POC PO2: 270.2 MM HG (ref 83–108)
POC PO2: 86 MM HG (ref 83–108)
POC PO2: 89.8 MM HG (ref 83–108)
POC POTASSIUM: 3 MMOL/L (ref 3.5–4.5)
POC POTASSIUM: 3.3 MMOL/L (ref 3.5–4.5)
POC POTASSIUM: 3.3 MMOL/L (ref 3.5–4.5)
POC POTASSIUM: 3.6 MMOL/L (ref 3.5–4.5)
POC POTASSIUM: 4.1 MMOL/L (ref 3.5–4.5)
POC SODIUM: 144 MMOL/L (ref 138–146)
POSITIVE BASE EXCESS, ART: 0 (ref 0–3)
POSITIVE BASE EXCESS, ART: 3 (ref 0–3)
POSITIVE BASE EXCESS, ART: ABNORMAL (ref 0–3)
POSITIVE BASE EXCESS, ART: NORMAL (ref 0–3)
POTASSIUM SERPL-SCNC: 3.3 MMOL/L (ref 3.7–5.3)
POTASSIUM SERPL-SCNC: 5 MMOL/L (ref 3.7–5.3)
PROTEIN UA: NEGATIVE
RBC # BLD: 2.28 M/UL (ref 3.95–5.11)
RBC UA: NORMAL /HPF (ref 0–4)
RENAL EPITHELIAL, UA: NORMAL /HPF
SAMPLE SITE: ABNORMAL
SAMPLE SITE: NORMAL
SAMPLE SITE: NORMAL
SODIUM BLD-SCNC: 147 MMOL/L (ref 135–144)
SPECIFIC GRAVITY UA: 1.04 (ref 1–1.03)
SPECIMEN DESCRIPTION: NORMAL
SPECIMEN DESCRIPTION: NORMAL
STATUS: NORMAL
TCO2 (CALC), ART: 22 MMOL/L (ref 22–29)
TCO2 (CALC), ART: 22 MMOL/L (ref 22–29)
TCO2 (CALC), ART: 24 MMOL/L (ref 22–29)
TCO2 (CALC), ART: 25 MMOL/L (ref 22–29)
TCO2 (CALC), ART: 26 MMOL/L (ref 22–29)
TCO2 (CALC), ART: 29 MMOL/L (ref 22–29)
TRICHOMONAS: NORMAL
TURBIDITY: ABNORMAL
URINE HGB: NEGATIVE
UROBILINOGEN, URINE: NORMAL
WBC # BLD: 18.9 K/UL (ref 3.5–11.3)
WBC UA: NORMAL /HPF (ref 0–5)
YEAST: NORMAL

## 2017-12-13 PROCEDURE — 81001 URINALYSIS AUTO W/SCOPE: CPT

## 2017-12-13 PROCEDURE — 83605 ASSAY OF LACTIC ACID: CPT

## 2017-12-13 PROCEDURE — 2500000003 HC RX 250 WO HCPCS

## 2017-12-13 PROCEDURE — P9045 ALBUMIN (HUMAN), 5%, 250 ML: HCPCS

## 2017-12-13 PROCEDURE — 83735 ASSAY OF MAGNESIUM: CPT

## 2017-12-13 PROCEDURE — P9037 PLATE PHERES LEUKOREDU IRRAD: HCPCS

## 2017-12-13 PROCEDURE — 85027 COMPLETE CBC AUTOMATED: CPT

## 2017-12-13 PROCEDURE — S0028 INJECTION, FAMOTIDINE, 20 MG: HCPCS | Performed by: THORACIC SURGERY (CARDIOTHORACIC VASCULAR SURGERY)

## 2017-12-13 PROCEDURE — 84132 ASSAY OF SERUM POTASSIUM: CPT

## 2017-12-13 PROCEDURE — 36415 COLL VENOUS BLD VENIPUNCTURE: CPT

## 2017-12-13 PROCEDURE — P9045 ALBUMIN (HUMAN), 5%, 250 ML: HCPCS | Performed by: NURSE ANESTHETIST, CERTIFIED REGISTERED

## 2017-12-13 PROCEDURE — A4648 IMPLANTABLE TISSUE MARKER: HCPCS | Performed by: THORACIC SURGERY (CARDIOTHORACIC VASCULAR SURGERY)

## 2017-12-13 PROCEDURE — 6360000002 HC RX W HCPCS: Performed by: NURSE ANESTHETIST, CERTIFIED REGISTERED

## 2017-12-13 PROCEDURE — 6360000002 HC RX W HCPCS

## 2017-12-13 PROCEDURE — 3700000000 HC ANESTHESIA ATTENDED CARE: Performed by: THORACIC SURGERY (CARDIOTHORACIC VASCULAR SURGERY)

## 2017-12-13 PROCEDURE — C1729 CATH, DRAINAGE: HCPCS | Performed by: THORACIC SURGERY (CARDIOTHORACIC VASCULAR SURGERY)

## 2017-12-13 PROCEDURE — 71020 XR CHEST STANDARD TWO VW: CPT

## 2017-12-13 PROCEDURE — 02100Z9 BYPASS CORONARY ARTERY, ONE ARTERY FROM LEFT INTERNAL MAMMARY, OPEN APPROACH: ICD-10-PCS | Performed by: THORACIC SURGERY (CARDIOTHORACIC VASCULAR SURGERY)

## 2017-12-13 PROCEDURE — C1875 STENT, COATED/COV W/O DEL SY: HCPCS | Performed by: THORACIC SURGERY (CARDIOTHORACIC VASCULAR SURGERY)

## 2017-12-13 PROCEDURE — 6360000002 HC RX W HCPCS: Performed by: NURSE PRACTITIONER

## 2017-12-13 PROCEDURE — 6370000000 HC RX 637 (ALT 250 FOR IP): Performed by: NURSE PRACTITIONER

## 2017-12-13 PROCEDURE — 93005 ELECTROCARDIOGRAM TRACING: CPT

## 2017-12-13 PROCEDURE — 87086 URINE CULTURE/COLONY COUNT: CPT

## 2017-12-13 PROCEDURE — 3600000008 HC SURGERY OHS BASE: Performed by: THORACIC SURGERY (CARDIOTHORACIC VASCULAR SURGERY)

## 2017-12-13 PROCEDURE — 87186 SC STD MICRODIL/AGAR DIL: CPT

## 2017-12-13 PROCEDURE — 2580000003 HC RX 258: Performed by: NURSE ANESTHETIST, CERTIFIED REGISTERED

## 2017-12-13 PROCEDURE — 6370000000 HC RX 637 (ALT 250 FOR IP): Performed by: NURSE ANESTHETIST, CERTIFIED REGISTERED

## 2017-12-13 PROCEDURE — 87088 URINE BACTERIA CULTURE: CPT

## 2017-12-13 PROCEDURE — 2500000003 HC RX 250 WO HCPCS: Performed by: NURSE ANESTHETIST, CERTIFIED REGISTERED

## 2017-12-13 PROCEDURE — P9016 RBC LEUKOCYTES REDUCED: HCPCS

## 2017-12-13 PROCEDURE — 2580000003 HC RX 258: Performed by: THORACIC SURGERY (CARDIOTHORACIC VASCULAR SURGERY)

## 2017-12-13 PROCEDURE — 2100000001 HC CVICU R&B

## 2017-12-13 PROCEDURE — 33519 CABG ARTERY-VEIN THREE: CPT | Performed by: THORACIC SURGERY (CARDIOTHORACIC VASCULAR SURGERY)

## 2017-12-13 PROCEDURE — 2720000010 HC SURG SUPPLY STERILE: Performed by: THORACIC SURGERY (CARDIOTHORACIC VASCULAR SURGERY)

## 2017-12-13 PROCEDURE — 3600000018 HC SURGERY OHS ADDTL 15MIN: Performed by: THORACIC SURGERY (CARDIOTHORACIC VASCULAR SURGERY)

## 2017-12-13 PROCEDURE — 3700000001 HC ADD 15 MINUTES (ANESTHESIA): Performed by: THORACIC SURGERY (CARDIOTHORACIC VASCULAR SURGERY)

## 2017-12-13 PROCEDURE — A6450 LT COMPRES BAND >=5"/YD: HCPCS | Performed by: THORACIC SURGERY (CARDIOTHORACIC VASCULAR SURGERY)

## 2017-12-13 PROCEDURE — 82803 BLOOD GASES ANY COMBINATION: CPT

## 2017-12-13 PROCEDURE — 021209W BYPASS CORONARY ARTERY, THREE ARTERIES FROM AORTA WITH AUTOLOGOUS VENOUS TISSUE, OPEN APPROACH: ICD-10-PCS | Performed by: THORACIC SURGERY (CARDIOTHORACIC VASCULAR SURGERY)

## 2017-12-13 PROCEDURE — 2500000003 HC RX 250 WO HCPCS: Performed by: THORACIC SURGERY (CARDIOTHORACIC VASCULAR SURGERY)

## 2017-12-13 PROCEDURE — 82565 ASSAY OF CREATININE: CPT

## 2017-12-13 PROCEDURE — 6370000000 HC RX 637 (ALT 250 FOR IP): Performed by: THORACIC SURGERY (CARDIOTHORACIC VASCULAR SURGERY)

## 2017-12-13 PROCEDURE — 33508 ENDOSCOPIC VEIN HARVEST: CPT | Performed by: THORACIC SURGERY (CARDIOTHORACIC VASCULAR SURGERY)

## 2017-12-13 PROCEDURE — 82435 ASSAY OF BLOOD CHLORIDE: CPT

## 2017-12-13 PROCEDURE — 36430 TRANSFUSION BLD/BLD COMPNT: CPT

## 2017-12-13 PROCEDURE — 84295 ASSAY OF SERUM SODIUM: CPT

## 2017-12-13 PROCEDURE — 71010 XR CHEST PORTABLE: CPT

## 2017-12-13 PROCEDURE — 6360000002 HC RX W HCPCS: Performed by: THORACIC SURGERY (CARDIOTHORACIC VASCULAR SURGERY)

## 2017-12-13 PROCEDURE — B24BZZ4 ULTRASONOGRAPHY OF HEART WITH AORTA, TRANSESOPHAGEAL: ICD-10-PCS | Performed by: ANESTHESIOLOGY

## 2017-12-13 PROCEDURE — 2580000003 HC RX 258: Performed by: NURSE PRACTITIONER

## 2017-12-13 PROCEDURE — 33533 CABG ARTERIAL SINGLE: CPT | Performed by: THORACIC SURGERY (CARDIOTHORACIC VASCULAR SURGERY)

## 2017-12-13 PROCEDURE — 85014 HEMATOCRIT: CPT

## 2017-12-13 PROCEDURE — 86900 BLOOD TYPING SEROLOGIC ABO: CPT

## 2017-12-13 PROCEDURE — 99232 SBSQ HOSP IP/OBS MODERATE 35: CPT | Performed by: INTERNAL MEDICINE

## 2017-12-13 PROCEDURE — 82330 ASSAY OF CALCIUM: CPT

## 2017-12-13 PROCEDURE — 6370000000 HC RX 637 (ALT 250 FOR IP): Performed by: PHYSICIAN ASSISTANT

## 2017-12-13 PROCEDURE — 85049 AUTOMATED PLATELET COUNT: CPT

## 2017-12-13 PROCEDURE — 06BQ4ZZ EXCISION OF LEFT SAPHENOUS VEIN, PERCUTANEOUS ENDOSCOPIC APPROACH: ICD-10-PCS | Performed by: THORACIC SURGERY (CARDIOTHORACIC VASCULAR SURGERY)

## 2017-12-13 PROCEDURE — 80048 BASIC METABOLIC PNL TOTAL CA: CPT

## 2017-12-13 PROCEDURE — A6449 LT COMPRES BAND >=3" <5"/YD: HCPCS | Performed by: THORACIC SURGERY (CARDIOTHORACIC VASCULAR SURGERY)

## 2017-12-13 PROCEDURE — 82947 ASSAY GLUCOSE BLOOD QUANT: CPT

## 2017-12-13 PROCEDURE — C1773 RET DEV, INSERTABLE: HCPCS | Performed by: THORACIC SURGERY (CARDIOTHORACIC VASCULAR SURGERY)

## 2017-12-13 DEVICE — U-SHAPED STYLE, SILICONE (1 PER STERILE PKG)
Type: IMPLANTABLE DEVICE | Site: HEART | Status: FUNCTIONAL
Brand: SCANLAN® A/C LOCATOR® GRAFT MARKER

## 2017-12-13 RX ORDER — ESMOLOL HYDROCHLORIDE 10 MG/ML
INJECTION INTRAVENOUS PRN
Status: DISCONTINUED | OUTPATIENT
Start: 2017-12-13 | End: 2017-12-13 | Stop reason: SDUPTHER

## 2017-12-13 RX ORDER — ETOMIDATE 2 MG/ML
INJECTION INTRAVENOUS PRN
Status: DISCONTINUED | OUTPATIENT
Start: 2017-12-13 | End: 2017-12-13 | Stop reason: SDUPTHER

## 2017-12-13 RX ORDER — FENTANYL CITRATE 50 UG/ML
50 INJECTION, SOLUTION INTRAMUSCULAR; INTRAVENOUS
Status: DISCONTINUED | OUTPATIENT
Start: 2017-12-13 | End: 2017-12-20 | Stop reason: HOSPADM

## 2017-12-13 RX ORDER — HYDRALAZINE HYDROCHLORIDE 20 MG/ML
5 INJECTION INTRAMUSCULAR; INTRAVENOUS EVERY 5 MIN PRN
Status: DISCONTINUED | OUTPATIENT
Start: 2017-12-13 | End: 2017-12-20 | Stop reason: HOSPADM

## 2017-12-13 RX ORDER — FENTANYL CITRATE 50 UG/ML
INJECTION, SOLUTION INTRAMUSCULAR; INTRAVENOUS PRN
Status: DISCONTINUED | OUTPATIENT
Start: 2017-12-13 | End: 2017-12-13 | Stop reason: SDUPTHER

## 2017-12-13 RX ORDER — SODIUM CHLORIDE 0.9 % (FLUSH) 0.9 %
10 SYRINGE (ML) INJECTION PRN
Status: DISCONTINUED | OUTPATIENT
Start: 2017-12-13 | End: 2017-12-13

## 2017-12-13 RX ORDER — DIPHENHYDRAMINE HCL 25 MG
25 TABLET ORAL NIGHTLY PRN
Status: DISCONTINUED | OUTPATIENT
Start: 2017-12-14 | End: 2017-12-20 | Stop reason: HOSPADM

## 2017-12-13 RX ORDER — ALBUMIN, HUMAN INJ 5% 5 %
SOLUTION INTRAVENOUS PRN
Status: DISCONTINUED | OUTPATIENT
Start: 2017-12-13 | End: 2017-12-13 | Stop reason: SDUPTHER

## 2017-12-13 RX ORDER — CHLORHEXIDINE GLUCONATE 0.12 MG/ML
15 RINSE ORAL ONCE
Status: DISCONTINUED | OUTPATIENT
Start: 2017-12-13 | End: 2017-12-13 | Stop reason: HOSPADM

## 2017-12-13 RX ORDER — ASPIRIN 81 MG/1
81 TABLET ORAL DAILY
Status: DISCONTINUED | OUTPATIENT
Start: 2017-12-13 | End: 2017-12-20 | Stop reason: HOSPADM

## 2017-12-13 RX ORDER — LIDOCAINE HYDROCHLORIDE 10 MG/ML
INJECTION, SOLUTION INFILTRATION; PERINEURAL PRN
Status: DISCONTINUED | OUTPATIENT
Start: 2017-12-13 | End: 2017-12-13 | Stop reason: SDUPTHER

## 2017-12-13 RX ORDER — MAGNESIUM SULFATE 1 G/100ML
1 INJECTION INTRAVENOUS PRN
Status: DISCONTINUED | OUTPATIENT
Start: 2017-12-13 | End: 2017-12-20 | Stop reason: HOSPADM

## 2017-12-13 RX ORDER — PAPAVERINE HYDROCHLORIDE 30 MG/ML
INJECTION INTRAMUSCULAR; INTRAVENOUS PRN
Status: DISCONTINUED | OUTPATIENT
Start: 2017-12-13 | End: 2017-12-13 | Stop reason: HOSPADM

## 2017-12-13 RX ORDER — DIGOXIN 0.25 MG/ML
500 INJECTION INTRAMUSCULAR; INTRAVENOUS ONCE
Status: COMPLETED | OUTPATIENT
Start: 2017-12-13 | End: 2017-12-13

## 2017-12-13 RX ORDER — MILRINONE LACTATE 0.2 MG/ML
INJECTION, SOLUTION INTRAVENOUS CONTINUOUS PRN
Status: DISCONTINUED | OUTPATIENT
Start: 2017-12-13 | End: 2017-12-13 | Stop reason: SDUPTHER

## 2017-12-13 RX ORDER — AMIODARONE HYDROCHLORIDE 200 MG/1
200 TABLET ORAL 3 TIMES DAILY
Status: DISCONTINUED | OUTPATIENT
Start: 2017-12-13 | End: 2017-12-20 | Stop reason: HOSPADM

## 2017-12-13 RX ORDER — M-VIT,TX,IRON,MINS/CALC/FOLIC 27MG-0.4MG
1 TABLET ORAL
Status: DISCONTINUED | OUTPATIENT
Start: 2017-12-14 | End: 2017-12-20 | Stop reason: HOSPADM

## 2017-12-13 RX ORDER — DEXTROSE MONOHYDRATE 50 MG/ML
100 INJECTION, SOLUTION INTRAVENOUS PRN
Status: DISCONTINUED | OUTPATIENT
Start: 2017-12-13 | End: 2017-12-20 | Stop reason: HOSPADM

## 2017-12-13 RX ORDER — NITROGLYCERIN 20 MG/100ML
INJECTION INTRAVENOUS PRN
Status: DISCONTINUED | OUTPATIENT
Start: 2017-12-13 | End: 2017-12-13 | Stop reason: SDUPTHER

## 2017-12-13 RX ORDER — SODIUM CHLORIDE, SODIUM LACTATE, POTASSIUM CHLORIDE, CALCIUM CHLORIDE 600; 310; 30; 20 MG/100ML; MG/100ML; MG/100ML; MG/100ML
INJECTION, SOLUTION INTRAVENOUS CONTINUOUS PRN
Status: DISCONTINUED | OUTPATIENT
Start: 2017-12-13 | End: 2017-12-13 | Stop reason: SDUPTHER

## 2017-12-13 RX ORDER — ONDANSETRON 2 MG/ML
4 INJECTION INTRAMUSCULAR; INTRAVENOUS EVERY 8 HOURS PRN
Status: DISCONTINUED | OUTPATIENT
Start: 2017-12-13 | End: 2017-12-20 | Stop reason: HOSPADM

## 2017-12-13 RX ORDER — SODIUM CHLORIDE 9 MG/ML
INJECTION, SOLUTION INTRAVENOUS CONTINUOUS
Status: DISCONTINUED | OUTPATIENT
Start: 2017-12-13 | End: 2017-12-15

## 2017-12-13 RX ORDER — MEPERIDINE HYDROCHLORIDE 50 MG/ML
25 INJECTION INTRAMUSCULAR; INTRAVENOUS; SUBCUTANEOUS
Status: ACTIVE | OUTPATIENT
Start: 2017-12-13 | End: 2017-12-13

## 2017-12-13 RX ORDER — VANCOMYCIN HYDROCHLORIDE 1 G/200ML
INJECTION, SOLUTION INTRAVENOUS PRN
Status: DISCONTINUED | OUTPATIENT
Start: 2017-12-13 | End: 2017-12-13 | Stop reason: SDUPTHER

## 2017-12-13 RX ORDER — SODIUM CHLORIDE 0.9 % (FLUSH) 0.9 %
10 SYRINGE (ML) INJECTION EVERY 12 HOURS SCHEDULED
Status: DISCONTINUED | OUTPATIENT
Start: 2017-12-13 | End: 2017-12-13

## 2017-12-13 RX ORDER — PROPOFOL 10 MG/ML
INJECTION, EMULSION INTRAVENOUS CONTINUOUS PRN
Status: DISCONTINUED | OUTPATIENT
Start: 2017-12-13 | End: 2017-12-13 | Stop reason: SDUPTHER

## 2017-12-13 RX ORDER — DEXTROSE MONOHYDRATE 25 G/50ML
12.5 INJECTION, SOLUTION INTRAVENOUS PRN
Status: DISCONTINUED | OUTPATIENT
Start: 2017-12-13 | End: 2017-12-20 | Stop reason: HOSPADM

## 2017-12-13 RX ORDER — ROCURONIUM BROMIDE 10 MG/ML
INJECTION, SOLUTION INTRAVENOUS PRN
Status: DISCONTINUED | OUTPATIENT
Start: 2017-12-13 | End: 2017-12-13 | Stop reason: SDUPTHER

## 2017-12-13 RX ORDER — NICOTINE POLACRILEX 4 MG
15 LOZENGE BUCCAL PRN
Status: DISCONTINUED | OUTPATIENT
Start: 2017-12-13 | End: 2017-12-20 | Stop reason: HOSPADM

## 2017-12-13 RX ORDER — AMIODARONE HYDROCHLORIDE 200 MG/1
200 TABLET ORAL 3 TIMES DAILY
Status: DISCONTINUED | OUTPATIENT
Start: 2017-12-13 | End: 2017-12-13

## 2017-12-13 RX ORDER — METOCLOPRAMIDE HYDROCHLORIDE 5 MG/ML
10 INJECTION INTRAMUSCULAR; INTRAVENOUS EVERY 6 HOURS PRN
Status: DISCONTINUED | OUTPATIENT
Start: 2017-12-13 | End: 2017-12-20 | Stop reason: HOSPADM

## 2017-12-13 RX ORDER — SODIUM CHLORIDE 0.9 % (FLUSH) 0.9 %
10 SYRINGE (ML) INJECTION PRN
Status: DISCONTINUED | OUTPATIENT
Start: 2017-12-13 | End: 2017-12-20 | Stop reason: HOSPADM

## 2017-12-13 RX ORDER — ASPIRIN 81 MG/1
81 TABLET ORAL DAILY
Status: DISCONTINUED | OUTPATIENT
Start: 2017-12-13 | End: 2017-12-13

## 2017-12-13 RX ORDER — SODIUM CHLORIDE 9 MG/ML
INJECTION, SOLUTION INTRAVENOUS CONTINUOUS
Status: DISCONTINUED | OUTPATIENT
Start: 2017-12-13 | End: 2017-12-13

## 2017-12-13 RX ORDER — CHLORHEXIDINE GLUCONATE 0.12 MG/ML
15 RINSE ORAL 2 TIMES DAILY
Status: DISCONTINUED | OUTPATIENT
Start: 2017-12-13 | End: 2017-12-14

## 2017-12-13 RX ORDER — POTASSIUM CHLORIDE 29.8 MG/ML
20 INJECTION INTRAVENOUS PRN
Status: DISCONTINUED | OUTPATIENT
Start: 2017-12-13 | End: 2017-12-20 | Stop reason: HOSPADM

## 2017-12-13 RX ORDER — TRAMADOL HYDROCHLORIDE 50 MG/1
50 TABLET ORAL EVERY 6 HOURS PRN
Status: DISCONTINUED | OUTPATIENT
Start: 2017-12-13 | End: 2017-12-20 | Stop reason: HOSPADM

## 2017-12-13 RX ORDER — CALCIUM CHLORIDE 100 MG/ML
INJECTION INTRAVENOUS; INTRAVENTRICULAR PRN
Status: DISCONTINUED | OUTPATIENT
Start: 2017-12-13 | End: 2017-12-13 | Stop reason: SDUPTHER

## 2017-12-13 RX ORDER — PROTAMINE SULFATE 10 MG/ML
INJECTION, SOLUTION INTRAVENOUS PRN
Status: DISCONTINUED | OUTPATIENT
Start: 2017-12-13 | End: 2017-12-13 | Stop reason: SDUPTHER

## 2017-12-13 RX ORDER — 0.9 % SODIUM CHLORIDE 0.9 %
250 INTRAVENOUS SOLUTION INTRAVENOUS ONCE
Status: DISCONTINUED | OUTPATIENT
Start: 2017-12-13 | End: 2017-12-18

## 2017-12-13 RX ORDER — AMIODARONE HYDROCHLORIDE 50 MG/ML
INJECTION, SOLUTION INTRAVENOUS PRN
Status: DISCONTINUED | OUTPATIENT
Start: 2017-12-13 | End: 2017-12-13 | Stop reason: SDUPTHER

## 2017-12-13 RX ORDER — FUROSEMIDE 10 MG/ML
INJECTION INTRAMUSCULAR; INTRAVENOUS PRN
Status: DISCONTINUED | OUTPATIENT
Start: 2017-12-13 | End: 2017-12-13 | Stop reason: SDUPTHER

## 2017-12-13 RX ORDER — DIGOXIN 0.25 MG/ML
250 INJECTION INTRAMUSCULAR; INTRAVENOUS DAILY
Status: DISCONTINUED | OUTPATIENT
Start: 2017-12-13 | End: 2017-12-15

## 2017-12-13 RX ORDER — SIMVASTATIN 20 MG
20 TABLET ORAL NIGHTLY
Status: DISCONTINUED | OUTPATIENT
Start: 2017-12-14 | End: 2017-12-20 | Stop reason: HOSPADM

## 2017-12-13 RX ORDER — MIDAZOLAM HYDROCHLORIDE 1 MG/ML
INJECTION INTRAMUSCULAR; INTRAVENOUS PRN
Status: DISCONTINUED | OUTPATIENT
Start: 2017-12-13 | End: 2017-12-13 | Stop reason: SDUPTHER

## 2017-12-13 RX ORDER — ACETAMINOPHEN 325 MG/1
650 TABLET ORAL EVERY 4 HOURS PRN
Status: DISCONTINUED | OUTPATIENT
Start: 2017-12-13 | End: 2017-12-20 | Stop reason: HOSPADM

## 2017-12-13 RX ORDER — CLOPIDOGREL BISULFATE 75 MG/1
75 TABLET ORAL DAILY
Status: DISCONTINUED | OUTPATIENT
Start: 2017-12-14 | End: 2017-12-20 | Stop reason: HOSPADM

## 2017-12-13 RX ORDER — VANCOMYCIN HYDROCHLORIDE 1 G/200ML
1000 INJECTION, SOLUTION INTRAVENOUS EVERY 12 HOURS
Status: COMPLETED | OUTPATIENT
Start: 2017-12-14 | End: 2017-12-14

## 2017-12-13 RX ORDER — ASPIRIN 300 MG/1
150 SUPPOSITORY RECTAL ONCE
Status: DISCONTINUED | OUTPATIENT
Start: 2017-12-13 | End: 2017-12-20 | Stop reason: HOSPADM

## 2017-12-13 RX ORDER — SODIUM CHLORIDE 9 MG/ML
INJECTION, SOLUTION INTRAVENOUS CONTINUOUS PRN
Status: DISCONTINUED | OUTPATIENT
Start: 2017-12-13 | End: 2017-12-13 | Stop reason: SDUPTHER

## 2017-12-13 RX ORDER — HEPARIN SODIUM 1000 [USP'U]/ML
INJECTION, SOLUTION INTRAVENOUS; SUBCUTANEOUS PRN
Status: DISCONTINUED | OUTPATIENT
Start: 2017-12-13 | End: 2017-12-13 | Stop reason: SDUPTHER

## 2017-12-13 RX ADMIN — SODIUM CHLORIDE, POTASSIUM CHLORIDE, SODIUM LACTATE AND CALCIUM CHLORIDE: 600; 310; 30; 20 INJECTION, SOLUTION INTRAVENOUS at 15:54

## 2017-12-13 RX ADMIN — ROCURONIUM BROMIDE 30 MG: 10 INJECTION INTRAVENOUS at 13:01

## 2017-12-13 RX ADMIN — CALCIUM CHLORIDE 0.5 G: 100 INJECTION, SOLUTION INTRAVENOUS at 14:33

## 2017-12-13 RX ADMIN — VASOPRESSIN 2 UNITS/HR: 20 INJECTION INTRAVENOUS at 15:00

## 2017-12-13 RX ADMIN — FENTANYL CITRATE 50 MCG: 50 INJECTION INTRAMUSCULAR; INTRAVENOUS at 22:48

## 2017-12-13 RX ADMIN — DIGOXIN 250 MCG: 0.25 INJECTION INTRAMUSCULAR; INTRAVENOUS at 18:07

## 2017-12-13 RX ADMIN — PHENYLEPHRINE HYDROCHLORIDE 200 MCG: 10 INJECTION INTRAVENOUS at 12:45

## 2017-12-13 RX ADMIN — FENTANYL CITRATE 100 MCG: 50 INJECTION INTRAMUSCULAR; INTRAVENOUS at 12:09

## 2017-12-13 RX ADMIN — MIDAZOLAM HYDROCHLORIDE 1 MG: 1 INJECTION, SOLUTION INTRAMUSCULAR; INTRAVENOUS at 16:39

## 2017-12-13 RX ADMIN — MIDAZOLAM HYDROCHLORIDE 1 MG: 1 INJECTION, SOLUTION INTRAMUSCULAR; INTRAVENOUS at 12:06

## 2017-12-13 RX ADMIN — Medication 10 ML: at 18:31

## 2017-12-13 RX ADMIN — DIGOXIN 500 MCG: 0.25 INJECTION INTRAMUSCULAR; INTRAVENOUS at 15:04

## 2017-12-13 RX ADMIN — FUROSEMIDE 40 MG: 10 INJECTION, SOLUTION INTRAMUSCULAR; INTRAVENOUS at 14:51

## 2017-12-13 RX ADMIN — FENTANYL CITRATE 150 MCG: 50 INJECTION INTRAMUSCULAR; INTRAVENOUS at 13:21

## 2017-12-13 RX ADMIN — MUPIROCIN: 20 OINTMENT TOPICAL at 08:59

## 2017-12-13 RX ADMIN — PHENYLEPHRINE HYDROCHLORIDE 200 MCG: 10 INJECTION INTRAVENOUS at 16:21

## 2017-12-13 RX ADMIN — SODIUM CHLORIDE, POTASSIUM CHLORIDE, SODIUM LACTATE AND CALCIUM CHLORIDE: 600; 310; 30; 20 INJECTION, SOLUTION INTRAVENOUS at 12:00

## 2017-12-13 RX ADMIN — PHENYLEPHRINE HYDROCHLORIDE 200 MCG: 10 INJECTION INTRAVENOUS at 15:25

## 2017-12-13 RX ADMIN — VASOPRESSIN 2 UNITS/HR: 20 INJECTION INTRAVENOUS at 22:24

## 2017-12-13 RX ADMIN — ALBUMIN (HUMAN) 12.5 G: 12.5 INJECTION, SOLUTION INTRAVENOUS at 16:12

## 2017-12-13 RX ADMIN — DEXTROSE 1 MG/MIN: 5 SOLUTION INTRAVENOUS at 18:30

## 2017-12-13 RX ADMIN — Medication 10 ML: at 20:30

## 2017-12-13 RX ADMIN — ALBUMIN (HUMAN) 12.5 G: 12.5 INJECTION, SOLUTION INTRAVENOUS at 13:01

## 2017-12-13 RX ADMIN — POTASSIUM CHLORIDE 20 MEQ: 400 INJECTION, SOLUTION INTRAVENOUS at 18:30

## 2017-12-13 RX ADMIN — SODIUM CHLORIDE, POTASSIUM CHLORIDE, SODIUM LACTATE AND CALCIUM CHLORIDE: 600; 310; 30; 20 INJECTION, SOLUTION INTRAVENOUS at 13:52

## 2017-12-13 RX ADMIN — NITROGLYCERIN 20 MCG: 20 INJECTION INTRAVENOUS at 13:27

## 2017-12-13 RX ADMIN — PHENYLEPHRINE HYDROCHLORIDE 200 MCG: 10 INJECTION INTRAVENOUS at 14:26

## 2017-12-13 RX ADMIN — PHENYLEPHRINE HYDROCHLORIDE 200 MCG: 10 INJECTION INTRAVENOUS at 15:21

## 2017-12-13 RX ADMIN — SODIUM CHLORIDE, POTASSIUM CHLORIDE, SODIUM LACTATE AND CALCIUM CHLORIDE: 600; 310; 30; 20 INJECTION, SOLUTION INTRAVENOUS at 14:07

## 2017-12-13 RX ADMIN — SODIUM CHLORIDE, POTASSIUM CHLORIDE, SODIUM LACTATE AND CALCIUM CHLORIDE: 600; 310; 30; 20 INJECTION, SOLUTION INTRAVENOUS at 16:38

## 2017-12-13 RX ADMIN — FAMOTIDINE 10 MG: 10 INJECTION, SOLUTION INTRAVENOUS at 20:35

## 2017-12-13 RX ADMIN — POTASSIUM CHLORIDE 20 MEQ: 400 INJECTION, SOLUTION INTRAVENOUS at 17:54

## 2017-12-13 RX ADMIN — TRAMADOL HYDROCHLORIDE 50 MG: 50 TABLET, FILM COATED ORAL at 21:09

## 2017-12-13 RX ADMIN — FENTANYL CITRATE 50 MCG: 50 INJECTION INTRAMUSCULAR; INTRAVENOUS at 19:41

## 2017-12-13 RX ADMIN — MAGNESIUM SULFATE HEPTAHYDRATE 1 G: 1 INJECTION, SOLUTION INTRAVENOUS at 20:19

## 2017-12-13 RX ADMIN — PROTAMINE SULFATE 200 MG: 10 INJECTION, SOLUTION INTRAVENOUS at 16:09

## 2017-12-13 RX ADMIN — VASOPRESSIN 2 UNITS/HR: 20 INJECTION INTRAVENOUS at 16:58

## 2017-12-13 RX ADMIN — AMIODARONE HYDROCHLORIDE 200 MG: 200 TABLET ORAL at 08:57

## 2017-12-13 RX ADMIN — Medication 50 MEQ: at 15:15

## 2017-12-13 RX ADMIN — ESMOLOL HYDROCHLORIDE 10 MG: 10 INJECTION, SOLUTION INTRAVENOUS at 15:28

## 2017-12-13 RX ADMIN — SODIUM CHLORIDE: 9 INJECTION, SOLUTION INTRAVENOUS at 17:54

## 2017-12-13 RX ADMIN — VANCOMYCIN HYDROCHLORIDE 1000 MG: 1 INJECTION, SOLUTION INTRAVENOUS at 13:05

## 2017-12-13 RX ADMIN — HEPARIN SODIUM 10000 UNITS: 1000 INJECTION, SOLUTION INTRAVENOUS; SUBCUTANEOUS at 15:09

## 2017-12-13 RX ADMIN — ALBUMIN (HUMAN) 12.5 G: 12.5 INJECTION, SOLUTION INTRAVENOUS at 16:04

## 2017-12-13 RX ADMIN — PROPOFOL 10 MCG/KG/MIN: 10 INJECTION, EMULSION INTRAVENOUS at 16:50

## 2017-12-13 RX ADMIN — SODIUM CHLORIDE 3 UNITS/HR: 9 INJECTION, SOLUTION INTRAVENOUS at 17:59

## 2017-12-13 RX ADMIN — POTASSIUM CHLORIDE 10 MEQ: 10 INJECTION, SOLUTION INTRAVENOUS at 14:21

## 2017-12-13 RX ADMIN — AMIODARONE HYDROCHLORIDE 200 MG: 200 TABLET ORAL at 20:31

## 2017-12-13 RX ADMIN — CHLORHEXIDINE GLUCONATE 15 ML: 1.2 RINSE ORAL at 20:30

## 2017-12-13 RX ADMIN — PHENYLEPHRINE HYDROCHLORIDE 300 MCG: 10 INJECTION INTRAVENOUS at 12:30

## 2017-12-13 RX ADMIN — HEPARIN SODIUM 3000 UNITS: 1000 INJECTION, SOLUTION INTRAVENOUS; SUBCUTANEOUS at 13:40

## 2017-12-13 RX ADMIN — Medication 50 MEQ: at 14:26

## 2017-12-13 RX ADMIN — PHENYLEPHRINE HYDROCHLORIDE 200 MCG: 10 INJECTION INTRAVENOUS at 12:59

## 2017-12-13 RX ADMIN — FAMOTIDINE 20 MG: 20 TABLET, FILM COATED ORAL at 08:57

## 2017-12-13 RX ADMIN — SODIUM CHLORIDE: 900 INJECTION, SOLUTION INTRAVENOUS at 12:50

## 2017-12-13 RX ADMIN — CALCIUM CHLORIDE 0.5 G: 100 INJECTION, SOLUTION INTRAVENOUS at 16:38

## 2017-12-13 RX ADMIN — HEPARIN SODIUM 10000 UNITS: 1000 INJECTION, SOLUTION INTRAVENOUS; SUBCUTANEOUS at 14:19

## 2017-12-13 RX ADMIN — SODIUM CHLORIDE, POTASSIUM CHLORIDE, SODIUM LACTATE AND CALCIUM CHLORIDE: 600; 310; 30; 20 INJECTION, SOLUTION INTRAVENOUS at 15:14

## 2017-12-13 RX ADMIN — EPINEPHRINE 0.04 MCG/KG/MIN: 1 INJECTION PARENTERAL at 16:58

## 2017-12-13 RX ADMIN — SODIUM CHLORIDE, POTASSIUM CHLORIDE, SODIUM LACTATE AND CALCIUM CHLORIDE: 600; 310; 30; 20 INJECTION, SOLUTION INTRAVENOUS at 13:05

## 2017-12-13 RX ADMIN — EPINEPHRINE 0.02 MCG/KG/MIN: 1 INJECTION PARENTERAL at 13:00

## 2017-12-13 RX ADMIN — PHENYLEPHRINE HYDROCHLORIDE 200 MCG: 10 INJECTION INTRAVENOUS at 12:18

## 2017-12-13 RX ADMIN — ALBUMIN (HUMAN) 12.5 G: 12.5 INJECTION, SOLUTION INTRAVENOUS at 13:12

## 2017-12-13 RX ADMIN — Medication 0.05 MCG/KG/MIN: at 13:18

## 2017-12-13 RX ADMIN — SODIUM CHLORIDE 3 UNITS/HR: 9 INJECTION, SOLUTION INTRAVENOUS at 16:19

## 2017-12-13 RX ADMIN — CALCIUM CHLORIDE 0.5 G: 100 INJECTION, SOLUTION INTRAVENOUS at 14:47

## 2017-12-13 RX ADMIN — PHENYLEPHRINE HYDROCHLORIDE 200 MCG: 10 INJECTION INTRAVENOUS at 15:32

## 2017-12-13 RX ADMIN — AMIODARONE HYDROCHLORIDE 150 MG: 50 INJECTION, SOLUTION INTRAVENOUS at 16:34

## 2017-12-13 RX ADMIN — LIDOCAINE HYDROCHLORIDE 50 MG: 10 INJECTION, SOLUTION INFILTRATION; PERINEURAL at 12:09

## 2017-12-13 RX ADMIN — ESMOLOL HYDROCHLORIDE 30 MG: 10 INJECTION, SOLUTION INTRAVENOUS at 15:32

## 2017-12-13 RX ADMIN — ALBUMIN (HUMAN) 12.5 G: 12.5 INJECTION, SOLUTION INTRAVENOUS at 15:02

## 2017-12-13 RX ADMIN — ESMOLOL HYDROCHLORIDE 20 MG: 10 INJECTION, SOLUTION INTRAVENOUS at 14:44

## 2017-12-13 RX ADMIN — HEPARIN SODIUM 21000 UNITS: 1000 INJECTION, SOLUTION INTRAVENOUS; SUBCUTANEOUS at 14:03

## 2017-12-13 RX ADMIN — FENTANYL CITRATE 150 MCG: 50 INJECTION INTRAMUSCULAR; INTRAVENOUS at 12:54

## 2017-12-13 RX ADMIN — PHENYLEPHRINE HYDROCHLORIDE 200 MCG: 10 INJECTION INTRAVENOUS at 16:38

## 2017-12-13 RX ADMIN — MAGNESIUM SULFATE HEPTAHYDRATE 1 G: 1 INJECTION, SOLUTION INTRAVENOUS at 18:55

## 2017-12-13 RX ADMIN — SODIUM CHLORIDE, POTASSIUM CHLORIDE, SODIUM LACTATE AND CALCIUM CHLORIDE: 600; 310; 30; 20 INJECTION, SOLUTION INTRAVENOUS at 16:27

## 2017-12-13 RX ADMIN — METOPROLOL TARTRATE 12.5 MG: 25 TABLET ORAL at 08:58

## 2017-12-13 RX ADMIN — ROCURONIUM BROMIDE 20 MG: 10 INJECTION INTRAVENOUS at 13:57

## 2017-12-13 RX ADMIN — DEXMEDETOMIDINE HYDROCHLORIDE 0.3 MCG/KG/HR: 100 INJECTION, SOLUTION INTRAVENOUS at 19:28

## 2017-12-13 RX ADMIN — PHENYLEPHRINE HYDROCHLORIDE 100 MCG: 10 INJECTION INTRAVENOUS at 14:00

## 2017-12-13 RX ADMIN — ALBUMIN (HUMAN) 12.5 G: 12.5 INJECTION, SOLUTION INTRAVENOUS at 16:17

## 2017-12-13 RX ADMIN — INSULIN HUMAN 3 UNITS: 100 INJECTION, SOLUTION PARENTERAL at 14:11

## 2017-12-13 RX ADMIN — MILRINONE LACTATE 0.2 MCG/KG/MIN: 200 INJECTION, SOLUTION INTRAVENOUS at 14:20

## 2017-12-13 RX ADMIN — ETOMIDATE 14 MG: 2 INJECTION, SOLUTION INTRAVENOUS at 12:09

## 2017-12-13 RX ADMIN — Medication 10 ML: at 09:07

## 2017-12-13 RX ADMIN — ROCURONIUM BROMIDE 50 MG: 10 INJECTION INTRAVENOUS at 15:26

## 2017-12-13 RX ADMIN — ROCURONIUM BROMIDE 50 MG: 10 INJECTION INTRAVENOUS at 12:09

## 2017-12-13 RX ADMIN — Medication 50 MEQ: at 15:07

## 2017-12-13 RX ADMIN — FENTANYL CITRATE 100 MCG: 50 INJECTION INTRAMUSCULAR; INTRAVENOUS at 13:27

## 2017-12-13 RX ADMIN — PHENYLEPHRINE HYDROCHLORIDE 100 MCG: 10 INJECTION INTRAVENOUS at 13:04

## 2017-12-13 RX ADMIN — SODIUM CHLORIDE 6 UNITS/HR: 9 INJECTION, SOLUTION INTRAVENOUS at 14:12

## 2017-12-13 RX ADMIN — ASPIRIN 81 MG: 81 TABLET, COATED ORAL at 08:57

## 2017-12-13 ASSESSMENT — PULMONARY FUNCTION TESTS
PIF_VALUE: 20
PIF_VALUE: 15
PIF_VALUE: 23
PIF_VALUE: 24
PIF_VALUE: 21
PIF_VALUE: 21
PIF_VALUE: 18
PIF_VALUE: 21
PIF_VALUE: 15
PIF_VALUE: 22
PIF_VALUE: 24
PIF_VALUE: 21
PIF_VALUE: 11
PIF_VALUE: 21
PIF_VALUE: 22
PIF_VALUE: 21
PIF_VALUE: 19
PIF_VALUE: 17
PIF_VALUE: 22
PIF_VALUE: 21
PIF_VALUE: 21
PIF_VALUE: 17
PIF_VALUE: 21
PIF_VALUE: 22
PIF_VALUE: 16
PIF_VALUE: 23
PIF_VALUE: 18
PIF_VALUE: 19
PIF_VALUE: 25
PIF_VALUE: 20
PIF_VALUE: 21
PIF_VALUE: 18
PIF_VALUE: 18
PIF_VALUE: 24
PIF_VALUE: 17
PIF_VALUE: 4
PIF_VALUE: 19
PIF_VALUE: 17
PIF_VALUE: 20
PIF_VALUE: 21
PIF_VALUE: 17
PIF_VALUE: 21
PIF_VALUE: 21
PIF_VALUE: 18
PIF_VALUE: 22
PIF_VALUE: 24
PIF_VALUE: 22
PIF_VALUE: 18
PIF_VALUE: 22
PIF_VALUE: 23
PIF_VALUE: 24
PIF_VALUE: 24
PIF_VALUE: 21
PIF_VALUE: 19
PIF_VALUE: 21
PIF_VALUE: 18
PIF_VALUE: 21
PIF_VALUE: 22
PIF_VALUE: 19
PIF_VALUE: 15
PIF_VALUE: 16
PIF_VALUE: 18
PIF_VALUE: 20
PIF_VALUE: 24
PIF_VALUE: 16
PIF_VALUE: 22
PIF_VALUE: 16
PIF_VALUE: 26
PIF_VALUE: 21
PIF_VALUE: 21
PIF_VALUE: 17
PIF_VALUE: 19
PIF_VALUE: 26
PIF_VALUE: 10
PIF_VALUE: 23
PIF_VALUE: 17
PIF_VALUE: 20
PIF_VALUE: 19
PIF_VALUE: 21
PIF_VALUE: 16
PIF_VALUE: 16
PIF_VALUE: 25
PIF_VALUE: 21
PIF_VALUE: 21
PIF_VALUE: 24
PIF_VALUE: 24
PIF_VALUE: 23
PIF_VALUE: 15
PIF_VALUE: 22
PIF_VALUE: 15
PIF_VALUE: 21
PIF_VALUE: 15
PIF_VALUE: 22
PIF_VALUE: 16
PIF_VALUE: 9
PIF_VALUE: 17
PIF_VALUE: 25
PIF_VALUE: 17
PIF_VALUE: 21
PIF_VALUE: 19
PIF_VALUE: 22
PIF_VALUE: 23
PIF_VALUE: 22
PIF_VALUE: 20
PIF_VALUE: 23
PIF_VALUE: 17
PIF_VALUE: 27
PIF_VALUE: 21
PIF_VALUE: 25
PIF_VALUE: 21
PIF_VALUE: 17
PIF_VALUE: 15
PIF_VALUE: 21
PIF_VALUE: 17
PIF_VALUE: 21
PIF_VALUE: 20
PIF_VALUE: 24
PIF_VALUE: 16
PIF_VALUE: 20
PIF_VALUE: 26
PIF_VALUE: 18
PIF_VALUE: 23
PIF_VALUE: 21
PIF_VALUE: 21
PIF_VALUE: 15
PIF_VALUE: 23
PIF_VALUE: 21
PIF_VALUE: 21
PIF_VALUE: 20
PIF_VALUE: 15
PIF_VALUE: 17
PIF_VALUE: 23
PIF_VALUE: 17
PIF_VALUE: 20
PIF_VALUE: 19
PIF_VALUE: 21
PIF_VALUE: 16
PIF_VALUE: 21
PIF_VALUE: 25
PIF_VALUE: 25
PIF_VALUE: 21
PIF_VALUE: 21
PIF_VALUE: 25
PIF_VALUE: 22
PIF_VALUE: 27
PIF_VALUE: 2
PIF_VALUE: 22
PIF_VALUE: 20
PIF_VALUE: 18
PIF_VALUE: 20
PIF_VALUE: 21
PIF_VALUE: 18
PIF_VALUE: 24
PIF_VALUE: 14
PIF_VALUE: 17
PIF_VALUE: 22
PIF_VALUE: 24
PIF_VALUE: 26
PIF_VALUE: 21
PIF_VALUE: 22
PIF_VALUE: 23
PIF_VALUE: 18
PIF_VALUE: 25
PIF_VALUE: 21
PIF_VALUE: 23
PIF_VALUE: 21
PIF_VALUE: 23
PIF_VALUE: 18
PIF_VALUE: 19
PIF_VALUE: 22
PIF_VALUE: 17
PIF_VALUE: 22
PIF_VALUE: 21
PIF_VALUE: 2
PIF_VALUE: 21
PIF_VALUE: 14
PIF_VALUE: 21
PIF_VALUE: 15
PIF_VALUE: 24
PIF_VALUE: 18
PIF_VALUE: 21
PIF_VALUE: 16
PIF_VALUE: 2
PIF_VALUE: 17
PIF_VALUE: 23
PIF_VALUE: 17
PIF_VALUE: 24
PIF_VALUE: 20
PIF_VALUE: 18
PIF_VALUE: 17
PIF_VALUE: 20
PIF_VALUE: 2
PIF_VALUE: 23
PIF_VALUE: 21
PIF_VALUE: 22
PIF_VALUE: 15
PIF_VALUE: 18
PIF_VALUE: 20
PIF_VALUE: 18
PIF_VALUE: 21
PIF_VALUE: 26
PIF_VALUE: 21
PIF_VALUE: 26
PIF_VALUE: 17
PIF_VALUE: 16
PIF_VALUE: 17
PIF_VALUE: 0
PIF_VALUE: 15
PIF_VALUE: 18
PIF_VALUE: 22
PIF_VALUE: 17
PIF_VALUE: 24
PIF_VALUE: 22
PIF_VALUE: 22
PIF_VALUE: 23
PIF_VALUE: 17
PIF_VALUE: 19
PIF_VALUE: 23
PIF_VALUE: 23
PIF_VALUE: 20
PIF_VALUE: 25
PIF_VALUE: 25
PIF_VALUE: 21
PIF_VALUE: 16
PIF_VALUE: 1
PIF_VALUE: 15
PIF_VALUE: 23
PIF_VALUE: 19
PIF_VALUE: 17
PIF_VALUE: 21
PIF_VALUE: 23
PIF_VALUE: 16
PIF_VALUE: 22
PIF_VALUE: 21
PIF_VALUE: 16
PIF_VALUE: 17
PIF_VALUE: 16
PIF_VALUE: 21
PIF_VALUE: 16
PIF_VALUE: 22
PIF_VALUE: 15
PIF_VALUE: 20
PIF_VALUE: 18
PIF_VALUE: 18
PIF_VALUE: 15
PIF_VALUE: 22
PIF_VALUE: 21
PIF_VALUE: 20
PIF_VALUE: 16
PIF_VALUE: 22
PIF_VALUE: 21
PIF_VALUE: 20
PIF_VALUE: 15
PIF_VALUE: 21
PIF_VALUE: 19
PIF_VALUE: 15
PIF_VALUE: 21
PIF_VALUE: 15
PIF_VALUE: 22
PIF_VALUE: 19
PIF_VALUE: 16
PIF_VALUE: 17
PIF_VALUE: 15
PIF_VALUE: 23
PIF_VALUE: 15
PIF_VALUE: 23
PIF_VALUE: 13
PIF_VALUE: 27
PIF_VALUE: 15
PIF_VALUE: 15
PIF_VALUE: 19
PIF_VALUE: 23
PIF_VALUE: 21
PIF_VALUE: 18
PIF_VALUE: 17
PIF_VALUE: 22
PIF_VALUE: 20
PIF_VALUE: 18
PIF_VALUE: 22
PIF_VALUE: 22

## 2017-12-13 ASSESSMENT — PAIN SCALES - GENERAL: PAINLEVEL_OUTOF10: 0

## 2017-12-13 NOTE — ANESTHESIA PRE PROCEDURE
4,000 Units Intravenous PRN Pippa Canales NP        heparin 25,000 units in dextrose 5% 250 mL infusion  12 Units/kg/hr Intravenous Continuous Pippa Canales NP 9.1 mL/hr at 12/12/17 0927 11 Units/kg/hr at 12/12/17 0927    nitroGLYCERIN (NITROSTAT) SL tablet 0.4 mg  0.4 mg Sublingual Q5 Min PRN Pippa Canales NP        simvastatin (ZOCOR) tablet 20 mg  20 mg Oral Nightly Pippa Canales NP   20 mg at 12/12/17 2035    dextrose 5 % and 0.45 % NaCl with KCl 20 mEq infusion   Intravenous Continuous Pippa Canales NP   Stopped at 12/11/17 0433    famotidine (PEPCID) tablet 20 mg  20 mg Oral BID Pippa Canales NP   20 mg at 12/12/17 2035    HYDROcodone-acetaminophen (NORCO) 5-325 MG per tablet 1 tablet  1 tablet Oral Q4H PRN Pippa Canales NP        Or    HYDROcodone-acetaminophen (NORCO) 5-325 MG per tablet 2 tablet  2 tablet Oral Q4H PRN Pippa Canales NP        magnesium hydroxide (MILK OF MAGNESIA) 400 MG/5ML suspension 30 mL  30 mL Oral Daily PRN Pippa Canales NP        magnesium sulfate 1 g in dextrose 5% 100 mL IVPB  1 g Intravenous PRN Pippa Canales NP        morphine injection 2 mg  2 mg Intravenous Q2H PRN Pippa Canales NP        Or   [facility] morphine (PF) injection 4 mg  4 mg Intravenous Q2H PRN Pippa Canales NP        ondansetron [facility]) injection 4 mg  4 mg Intravenous Q6H PRN Pippa Canales NP        potassium chloride (KLOR-CON M) extended release tablet 40 mEq  40 mEq Oral PRN Pippa Canales NP        Or    potassium chloride 20 MEQ/15ML (10%) oral solution 40 mEq  40 mEq Oral PRN Pippa Canales NP        Or    potassium chloride 10 mEq/100 mL IVPB (Peripheral Line)  10 mEq Intravenous PRN Pippa Canales NP        sodium chloride flush 0.9 % injection 10 mL  10 mL Intravenous 2 times per day Pippa Canales NP   10 mL at 12/12/17 2036    sodium chloride flush 0.9 % injection 10 mL  10 mL Intravenous PRN Norma Loredo NP        pneumococcal 13-valent conjugate (PREVNAR) injection 0.5 mL  0.5 mL Intramuscular Once Jenny Syed MD        influenza quadrivalent split vaccine (FLUZONE;FLUARIX;FLULAVAL;AFLURIA) injection 0.5 mL  0.5 mL Intramuscular Once Jenny Syed MD        nitroGLYCERIN 50 mg in dextrose 5% 250 mL infusion  5 mcg/min Intravenous Continuous Norma Loredo NP   Stopped at 12/11/17 1057       Allergies:  No Known Allergies    Problem List:    Patient Active Problem List   Diagnosis Code    MI, acute, non ST segment elevation ([facility] [address] I21.4    Pure hypercholesterolemia E78.00    Intermittent confusion R41.0       Past Medical History:        Diagnosis Date    NSTEMI (non-ST elevated myocardial infarction) ([facility] [address] 12/11/2017       Past Surgical History:        Procedure Laterality Date    CARDIAC CATHETERIZATION  12/12/2017    DR Bandar Boland CATARACT REMOVAL Bilateral     JOINT REPLACEMENT      total right knee       Social History:    Social History   Substance Use Topics    Smoking status: Never Smoker    Smokeless tobacco: Not on file    Alcohol use Not on file                                Counseling given: Not Answered      Vital Signs (Current):   Vitals:    12/12/17 1941 12/13/17 0009 12/13/17 0432 12/13/17 0531   BP: (!) 140/55 136/68 126/68    Pulse: 112 80 76    Resp: 18 18 16    Temp: 98 °F (36.7 °C) 97.8 °F (36.6 °C)     TempSrc: Oral Oral     SpO2: 93% 95% 94%    Weight:    188 lb 15 oz (85.7 kg)   Height:                                                  BP Readings from Last 3 Encounters:   12/13/17 126/68       NPO Status:                                                                                 BMI:   Wt Readings from Last 3 Encounters:   12/13/17 188 lb 15 oz (85.7 kg)     Body mass index is 36.9 kg/m².     CBC:   Lab Results   Component Value Date    WBC 8.8 12/12/2017    RBC 4.51 12/12/2017    HGB 14.4 12/12/2017    HCT 43.9 12/12/2017    MCV 97.3 12/12/2017    RDW 13.2 12/12/2017     12/13/2017       CMP:   Lab Results   Component Value Date     12/12/2017    K 4.7 12/12/2017     12/12/2017    CO2 26 12/12/2017    BUN 9 12/12/2017    CREATININE 0.77 12/12/2017    GFRAA >60 12/12/2017    LABGLOM >60 12/12/2017    GLUCOSE 125 12/12/2017    PROT 6.3 12/12/2017    CALCIUM 8.4 12/12/2017    BILITOT 0.50 12/12/2017    ALKPHOS 50 12/12/2017    AST 77 12/12/2017    ALT 22 12/12/2017       POC Tests: No results for input(s): POCGLU, POCNA, POCK, POCCL, POCBUN, POCHEMO, POCHCT in the last 72 hours. Coags:   Lab Results   Component Value Date    PROTIME 10.5 12/12/2017    INR 1.0 12/12/2017    APTT 24.7 12/12/2017       HCG (If Applicable): No results found for: PREGTESTUR, PREGSERUM, HCG, HCGQUANT     ABGs: No results found for: PHART, PO2ART, BZW6BWD, QWH5SKD, BEART, C0FNASLR     Type & Screen (If Applicable):  No results found for: LABABO, [address]    Anesthesia Evaluation  Patient summary reviewed no history of anesthetic complications:   Airway: Mallampati: III  TM distance: >3 FB   Neck ROM: full  Mouth opening: > = 3 FB Dental: normal exam         Pulmonary:Negative Pulmonary ROS and normal exam                               Cardiovascular:  Exercise tolerance: poor (<4 METS),   (+) past MI: no interval change, CAD:, hyperlipidemia                  Neuro/Psych:   Negative Neuro/Psych ROS              GI/Hepatic/Renal: Neg GI/Hepatic/Renal ROS            Endo/Other: Negative Endo/Other ROS                    Abdominal:           Vascular: negative vascular ROS. Anesthesia Plan      general     ASA 4       Induction: intravenous. arterial line, BIS, central line, CVP, PA catheter and MAL    Anesthetic plan and risks discussed with patient. Plan discussed with CRNA.                   Tj Bobby MD   12/13/2017

## 2017-12-13 NOTE — ANESTHESIA POSTPROCEDURE EVALUATION
Department of Anesthesiology  Postprocedure Note    Patient: Kit Aguilar  MRN: [id number]  YOB: 1935  Date of evaluation: 12/13/2017  Time:  5:36 PM     Procedure Summary     Date:  12/13/17 Room / Location:  [facility] CVOR 20 / [facility] CVOR    Anesthesia Start:  1200 Anesthesia Stop:  1714    Procedure:  CABG CORONARY ARTERY BYPASS x 4 OFF PUMP, SWAN, MAL (N/A Chest) Diagnosis:  (CAD)    Surgeon:  Samantha Jiménez MD Responsible Provider:  Dave De La Torre MD    Anesthesia Type:  general ASA Status:  4          Anesthesia Type: general    Vidal Phase I:      Vidal Phase II:      Last vitals: Reviewed and per EMR flowsheets.        Anesthesia Post Evaluation    Patient location during evaluation: ICU  Patient participation: waiting for patient participation  Level of consciousness: sedated and ventilated  Pain score: 0  Airway patency: patent (on ventilator)  Nausea & Vomiting: no nausea and no vomiting  Complications: no  Cardiovascular status: hemodynamically stable (hemodynamically sable on inotropic support.)  Respiratory status: ventilator (Vent settings per cardiothoracic surgery)  Hydration status: stable

## 2017-12-13 NOTE — ANESTHESIA PROCEDURE NOTES
Arterial Line:    An arterial line was placed using ultrasound guidance and surface landmarks, in the OR for the following indication(s): continuous blood pressure monitoring and blood sampling needed. A 20 gauge (size), 1 and 3/4 inch (length), Arrow (type) catheter was placed, Seldinger technique used, into the left radial artery, secured by Tegaderm and tape. Anesthesia type: General    Events:  patient tolerated procedure well with no complications.   12/13/2017 12:10 PM12/13/2017 12:15 PM  Anesthesiologist: Jennifer Owen  Resident/CRNA: Ila OLSEN  Performed: Resident/CRNA   Preanesthetic Checklist  Completed: patient identified, site marked, surgical consent, pre-op evaluation, timeout performed, IV checked, risks and benefits discussed, monitors and equipment checked, anesthesia consent given, oxygen available and patient being monitored

## 2017-12-13 NOTE — ANESTHESIA PROCEDURE NOTES
Procedure Performed: MAL s/p CABG. Start Time:  12/13/2017 4:40 PM       End Time:   12/13/2017 4:45 PM    Preanesthesia Checklist:  Patient identified, IV assessed, risks and benefits discussed, monitors and equipment assessed, procedure being performed at surgeon's request and anesthesia consent obtained. General Procedure Information  Diagnostic Indications for Echo:  assessment of surgical repair and hemodynamic monitoring  Physician Requesting Echo: Ebonie Miller  Location performed:  OR  Intubated  Bite block not placed  Heart visualized  Probe Insertion:  Easy  Probe Type:  3D  Modalities:  2D only    Echocardiographic and Doppler Measurements    Ventricles    Right Ventricle:  Cavity size normal.  Hypertrophy not present. Thrombus not present. Global function normal.    Left Ventricle:  Cavity size normal.  Hypertrophy present. Thrombus not present. Global Function mildly impaired. Ejection Fraction 35%. Ventricular Regional Function:    Wall Motion Comments:       Ventricular wall motion same as pre-CABG with hypokinesis. Anesthesia Information  Performed Personally  Anesthesiologist:  Merlin Villalta      Echocardiogram Comments: The post CABG finding were similar to pre-CABG. No new valvular or wall motion dysfunction noted. All the finding were conveyed to the surgeon.

## 2017-12-13 NOTE — PLAN OF CARE
Problem: Pain:  Goal: Pain level will decrease  Pain level will decrease   Outcome: Ongoing      Problem: Falls - Risk of  Goal: Absence of falls  Outcome: Ongoing      Problem: Risk for Impaired Skin Integrity  Goal: Tissue integrity - skin and mucous membranes  Structural intactness and normal physiological function of skin and  mucous membranes.    Outcome: Ongoing

## 2017-12-13 NOTE — ANESTHESIA PROCEDURE NOTES
Procedure Performed: MAL       Start Time:  12/13/2017 12:46 PM       End Time:   12/13/2017 1:00 PM    Preanesthesia Checklist:  Patient identified, IV assessed, risks and benefits discussed, monitors and equipment assessed, procedure being performed at surgeon's request and anesthesia consent obtained. General Procedure Information  Diagnostic Indications for Echo:  assessment of surgical repair, hemodynamic monitoring and assessment of valve function  Physician Requesting Echo: Avani Lozano  Location performed:  OR  Intubated  Bite block not placed  Heart visualized  Probe Insertion:  Easy  Probe Type:  3D  Modalities:  2D only and continuous wave Doppler    Echocardiographic and Doppler Measurements    Ventricles    Right Ventricle:  Cavity size normal.  Hypertrophy not present. Thrombus not present. Global function normal.    Left Ventricle:  Cavity size normal.  Hypertrophy present. Thrombus not present. Global Function mildly impaired. Ejection Fraction 35%. Ventricular Regional Function:  5- Basal Inferior:  hypokinetic  6- Basal Inferoseptal:  hypokinetic  12- Mid Inferoseptal:  hypokinetic  16- Apical Septal:  hypokinetic      Valves    Aortic Valve: Annulus normal.  Stenosis not present. Regurgitation none. Leaflets calcified. Leaflet motions normal.      Mitral Valve: Annulus normal.  Stenosis not present. Regurgitation none. Leaflets calcified. Leaflet motions normal.      Tricuspid Valve: Annulus normal.  Stenosis not present. Regurgitation none. Leaflets normal.  Leaflet motions normal.    Pulmonic Valve: Annulus normal.  Stenosis not present. Regurgitation none. Aorta    Ascending Aorta:  Size normal.    Aortic Arch:  Size normal.    Descending Aorta:  Size normal.          Atria    Right Atrium:  Size normal.    Left Atrium:  Size normal.  Spontaneous echo contrast not present. Thrombus not present. Tumor not present. Device not present.     Left atrial appendage normal.      Septa    Atrial Septum:  Intra-atrial septal morphology normal.      Ventricular Septum:  Intra-ventricular septum morphology hypertrophy.       Diastolic Function Measurements:  Diastolic Dysfunction Grade= I (Delayed relaxation)  E= ms  A= ms  E/A Ratio=   DT= ms  S/D=  IVRT=    Other Findings  Pericardium:  normal  Pleural Effusion:  none  Pulmonary Arteries:  normal      Anesthesia Information  Anesthesiologist:  Amelia Olson

## 2017-12-13 NOTE — ANESTHESIA PROCEDURE NOTES
Central Venous Line:    A central venous line was placed using surface landmarks, in the OR for the following indication(s): central venous access and CVP monitoring. Sterility preparation included the following: hand hygiene performed prior to procedure, maximum sterile barriers used and sterile technique used to drape from head to toe. The patient was placed in Trendelenburg position. The left subclavian vein was prepped. The site was prepped with Chloraprep. Catheter size: 8 Fr. 16 (length), double lumen was placed. During the procedure, the following specific steps were taken: target vein identified, needle advanced into vein and blood aspirated and guidewire advanced into vein. Intravenous verification was obtained by venous blood return and MAL. Post insertion care included: all ports aspirated, all ports flushed easily, guidewire removed intact, line sutured in place and dressing applied. During the procedure the patient experienced: patient tolerated procedure well with no complications.       Anesthesia type: general..No  Staffing  Anesthesiologist: Boby Steinberg  Resident/CRNA: Bill OLSEN  Preanesthetic Checklist  Completed: patient identified, site marked, surgical consent, pre-op evaluation, timeout performed, IV checked, risks and benefits discussed, monitors and equipment checked, anesthesia consent given, oxygen available and patient being monitored

## 2017-12-13 NOTE — PLAN OF CARE
Problem: SKIN INTEGRITY  Goal: Skin integrity is maintained or improved  Outcome: Ongoing      Problem: OXYGENATION/RESPIRATORY FUNCTION  Goal: Patient will maintain patent airway  Outcome: Ongoing    Goal: Patient will achieve/maintain normal respiratory rate/effort  Respiratory rate and effort will be within normal limits for the patient  Outcome: Ongoing      Problem: MECHANICAL VENTILATION  Goal: Patient will maintain patent airway  Outcome: Ongoing    Goal: Oral health is maintained or improved  Outcome: Ongoing    Goal: ET tube will be managed safely  Outcome: Ongoing

## 2017-12-14 ENCOUNTER — APPOINTMENT (OUTPATIENT)
Dept: GENERAL RADIOLOGY | Age: 82
DRG: 233 | End: 2017-12-14
Attending: INTERNAL MEDICINE
Payer: MEDICARE

## 2017-12-14 PROBLEM — D50.0 ANEMIA, BLOOD LOSS: Status: ACTIVE | Noted: 2017-12-14

## 2017-12-14 LAB
ALLEN TEST: ABNORMAL
ANION GAP SERPL CALCULATED.3IONS-SCNC: 9 MMOL/L (ref 9–17)
BLD PROD TYP BPU: NORMAL
BUN BLDV-MCNC: 11 MG/DL (ref 8–23)
BUN/CREAT BLD: ABNORMAL (ref 9–20)
CALCIUM IONIZED: 1.11 MMOL/L (ref 1.13–1.33)
CALCIUM SERPL-MCNC: 7.5 MG/DL (ref 8.6–10.4)
CHLORIDE BLD-SCNC: 107 MMOL/L (ref 98–107)
CO2: 24 MMOL/L (ref 20–31)
CREAT SERPL-MCNC: 0.84 MG/DL (ref 0.5–0.9)
CULTURE: NO GROWTH
CULTURE: NORMAL
DISPENSE STATUS BLOOD BANK: NORMAL
EKG ATRIAL RATE: 108 BPM
EKG P AXIS: 72 DEGREES
EKG P-R INTERVAL: 240 MS
EKG Q-T INTERVAL: 344 MS
EKG QRS DURATION: 98 MS
EKG QTC CALCULATION (BAZETT): 460 MS
EKG R AXIS: 14 DEGREES
EKG T AXIS: -111 DEGREES
EKG VENTRICULAR RATE: 108 BPM
FIO2: ABNORMAL
GFR AFRICAN AMERICAN: >60 ML/MIN
GFR NON-AFRICAN AMERICAN: >60 ML/MIN
GFR SERPL CREATININE-BSD FRML MDRD: ABNORMAL ML/MIN/{1.73_M2}
GFR SERPL CREATININE-BSD FRML MDRD: ABNORMAL ML/MIN/{1.73_M2}
GLUCOSE BLD-MCNC: 127 MG/DL (ref 65–105)
GLUCOSE BLD-MCNC: 128 MG/DL (ref 65–105)
GLUCOSE BLD-MCNC: 128 MG/DL (ref 65–105)
GLUCOSE BLD-MCNC: 136 MG/DL (ref 65–105)
GLUCOSE BLD-MCNC: 138 MG/DL (ref 65–105)
GLUCOSE BLD-MCNC: 148 MG/DL (ref 65–105)
GLUCOSE BLD-MCNC: 160 MG/DL (ref 65–105)
GLUCOSE BLD-MCNC: 163 MG/DL (ref 70–99)
GLUCOSE BLD-MCNC: 196 MG/DL (ref 65–105)
GLUCOSE BLD-MCNC: 214 MG/DL (ref 74–100)
HBCO, MIXED, EXTENDED: 1.3 % (ref 0–5)
HCT VFR BLD CALC: 24.2 % (ref 36.3–47.1)
HCT VFR BLD CALC: 24.5 % (ref 36.3–47.1)
HCT VFR BLD CALC: 24.9 % (ref 36.3–47.1)
HEMOGLOBIN, MIXED, EXTENDED: 7.4 G/DL (ref 12–18)
HEMOGLOBIN: 7.6 G/DL (ref 11.9–15.1)
HEMOGLOBIN: 8 G/DL (ref 11.9–15.1)
HEMOGLOBIN: 8.1 G/DL (ref 11.9–15.1)
Lab: NORMAL
MAGNESIUM: 1.9 MG/DL (ref 1.6–2.6)
MCH RBC QN AUTO: 30.2 PG (ref 25.2–33.5)
MCHC RBC AUTO-ENTMCNC: 32.7 G/DL (ref 28.4–34.8)
MCV RBC AUTO: 92.5 FL (ref 82.6–102.9)
METHB, MIXED, EXTENDED: 0.1 % (ref 0–1.5)
MODE: ABNORMAL
NEGATIVE BASE EXCESS, ART: ABNORMAL (ref 0–2)
O2 CONTENT, MIXED, EXTENDED: 7 VOL % (ref 6–15)
O2 DEVICE/FLOW/%: ABNORMAL
O2 SAT, MIXED, EXTENDED: 65.3 % (ref 60–80)
OXYGEN STATUS: 30
PATIENT TEMP: 37.9
PDW BLD-RTO: 15.3 % (ref 11.8–14.4)
PLATELET # BLD: ABNORMAL K/UL (ref 138–453)
PLATELET, FLUORESCENCE: 115 K/UL (ref 138–453)
PLATELET, IMMATURE FRACTION: 4.9 % (ref 1.1–10.3)
PMV BLD AUTO: ABNORMAL FL (ref 8.1–13.5)
POC HCO3: 24.9 MMOL/L (ref 21–28)
POC O2 SATURATION: 96 % (ref 94–98)
POC PCO2 TEMP: 37 MM HG
POC PCO2: 35.4 MM HG (ref 35–48)
POC PH TEMP: 7.44
POC PH: 7.46 (ref 7.35–7.45)
POC PO2 TEMP: 83 MM HG
POC PO2: 78.5 MM HG (ref 83–108)
POSITIVE BASE EXCESS, ART: 1 (ref 0–3)
POTASSIUM SERPL-SCNC: 4.9 MMOL/L (ref 3.7–5.3)
POTASSIUM SERPL-SCNC: 5 MMOL/L (ref 3.7–5.3)
RBC # BLD: 2.65 M/UL (ref 3.95–5.11)
SAMPLE SITE: ABNORMAL
SODIUM BLD-SCNC: 140 MMOL/L (ref 135–144)
SPECIMEN DESCRIPTION: NORMAL
STATUS: NORMAL
TCO2 (CALC), ART: 26 MMOL/L (ref 22–29)
TRANSFUSION STATUS: NORMAL
UNIT DIVISION: 0
UNIT NUMBER: NORMAL
WBC # BLD: 10.6 K/UL (ref 3.5–11.3)

## 2017-12-14 PROCEDURE — 6360000002 HC RX W HCPCS: Performed by: PHYSICIAN ASSISTANT

## 2017-12-14 PROCEDURE — G8978 MOBILITY CURRENT STATUS: HCPCS

## 2017-12-14 PROCEDURE — G8988 SELF CARE GOAL STATUS: HCPCS

## 2017-12-14 PROCEDURE — 97535 SELF CARE MNGMENT TRAINING: CPT

## 2017-12-14 PROCEDURE — P9046 ALBUMIN (HUMAN), 25%, 20 ML: HCPCS | Performed by: THORACIC SURGERY (CARDIOTHORACIC VASCULAR SURGERY)

## 2017-12-14 PROCEDURE — 36430 TRANSFUSION BLD/BLD COMPNT: CPT

## 2017-12-14 PROCEDURE — 94762 N-INVAS EAR/PLS OXIMTRY CONT: CPT

## 2017-12-14 PROCEDURE — 80048 BASIC METABOLIC PNL TOTAL CA: CPT

## 2017-12-14 PROCEDURE — 82947 ASSAY GLUCOSE BLOOD QUANT: CPT

## 2017-12-14 PROCEDURE — 6370000000 HC RX 637 (ALT 250 FOR IP): Performed by: THORACIC SURGERY (CARDIOTHORACIC VASCULAR SURGERY)

## 2017-12-14 PROCEDURE — 85018 HEMOGLOBIN: CPT

## 2017-12-14 PROCEDURE — 2500000003 HC RX 250 WO HCPCS: Performed by: THORACIC SURGERY (CARDIOTHORACIC VASCULAR SURGERY)

## 2017-12-14 PROCEDURE — 71010 XR CHEST PORTABLE: CPT

## 2017-12-14 PROCEDURE — 82803 BLOOD GASES ANY COMBINATION: CPT

## 2017-12-14 PROCEDURE — 84132 ASSAY OF SERUM POTASSIUM: CPT

## 2017-12-14 PROCEDURE — 94660 CPAP INITIATION&MGMT: CPT

## 2017-12-14 PROCEDURE — S0028 INJECTION, FAMOTIDINE, 20 MG: HCPCS | Performed by: THORACIC SURGERY (CARDIOTHORACIC VASCULAR SURGERY)

## 2017-12-14 PROCEDURE — 85014 HEMATOCRIT: CPT

## 2017-12-14 PROCEDURE — 82805 BLOOD GASES W/O2 SATURATION: CPT

## 2017-12-14 PROCEDURE — 97530 THERAPEUTIC ACTIVITIES: CPT

## 2017-12-14 PROCEDURE — 97110 THERAPEUTIC EXERCISES: CPT

## 2017-12-14 PROCEDURE — G8979 MOBILITY GOAL STATUS: HCPCS

## 2017-12-14 PROCEDURE — P9016 RBC LEUKOCYTES REDUCED: HCPCS

## 2017-12-14 PROCEDURE — 6360000002 HC RX W HCPCS: Performed by: THORACIC SURGERY (CARDIOTHORACIC VASCULAR SURGERY)

## 2017-12-14 PROCEDURE — 86900 BLOOD TYPING SEROLOGIC ABO: CPT

## 2017-12-14 PROCEDURE — 97162 PT EVAL MOD COMPLEX 30 MIN: CPT

## 2017-12-14 PROCEDURE — 99232 SBSQ HOSP IP/OBS MODERATE 35: CPT | Performed by: INTERNAL MEDICINE

## 2017-12-14 PROCEDURE — 97166 OT EVAL MOD COMPLEX 45 MIN: CPT

## 2017-12-14 PROCEDURE — G8987 SELF CARE CURRENT STATUS: HCPCS

## 2017-12-14 PROCEDURE — 36415 COLL VENOUS BLD VENIPUNCTURE: CPT

## 2017-12-14 PROCEDURE — 82330 ASSAY OF CALCIUM: CPT

## 2017-12-14 PROCEDURE — 2580000003 HC RX 258: Performed by: THORACIC SURGERY (CARDIOTHORACIC VASCULAR SURGERY)

## 2017-12-14 PROCEDURE — 83050 HGB METHEMOGLOBIN QUAN: CPT

## 2017-12-14 PROCEDURE — 2100000001 HC CVICU R&B

## 2017-12-14 PROCEDURE — 83735 ASSAY OF MAGNESIUM: CPT

## 2017-12-14 PROCEDURE — 97116 GAIT TRAINING THERAPY: CPT

## 2017-12-14 PROCEDURE — 82375 ASSAY CARBOXYHB QUANT: CPT

## 2017-12-14 PROCEDURE — 85027 COMPLETE CBC AUTOMATED: CPT

## 2017-12-14 RX ORDER — FUROSEMIDE 10 MG/ML
40 INJECTION INTRAMUSCULAR; INTRAVENOUS ONCE
Status: COMPLETED | OUTPATIENT
Start: 2017-12-14 | End: 2017-12-14

## 2017-12-14 RX ORDER — CARVEDILOL 3.12 MG/1
3.12 TABLET ORAL 2 TIMES DAILY WITH MEALS
Status: DISCONTINUED | OUTPATIENT
Start: 2017-12-14 | End: 2017-12-18

## 2017-12-14 RX ORDER — ALBUMIN (HUMAN) 12.5 G/50ML
25 SOLUTION INTRAVENOUS ONCE
Status: COMPLETED | OUTPATIENT
Start: 2017-12-14 | End: 2017-12-14

## 2017-12-14 RX ORDER — FUROSEMIDE 10 MG/ML
40 INJECTION INTRAMUSCULAR; INTRAVENOUS ONCE
Status: CANCELLED | OUTPATIENT
Start: 2017-12-14 | End: 2017-12-14

## 2017-12-14 RX ORDER — FAMOTIDINE 20 MG/1
10 TABLET, FILM COATED ORAL 2 TIMES DAILY
Status: DISCONTINUED | OUTPATIENT
Start: 2017-12-14 | End: 2017-12-20 | Stop reason: HOSPADM

## 2017-12-14 RX ADMIN — AMIODARONE HYDROCHLORIDE 200 MG: 200 TABLET ORAL at 14:13

## 2017-12-14 RX ADMIN — MAGNESIUM SULFATE HEPTAHYDRATE 1 G: 1 INJECTION, SOLUTION INTRAVENOUS at 22:32

## 2017-12-14 RX ADMIN — ALBUMIN (HUMAN) 25 G: 0.25 INJECTION, SOLUTION INTRAVENOUS at 14:13

## 2017-12-14 RX ADMIN — FUROSEMIDE 40 MG: 10 INJECTION, SOLUTION INTRAMUSCULAR; INTRAVENOUS at 08:05

## 2017-12-14 RX ADMIN — ONDANSETRON 4 MG: 2 INJECTION INTRAMUSCULAR; INTRAVENOUS at 08:22

## 2017-12-14 RX ADMIN — FAMOTIDINE 10 MG: 20 TABLET, FILM COATED ORAL at 21:10

## 2017-12-14 RX ADMIN — SIMVASTATIN 20 MG: 20 TABLET, FILM COATED ORAL at 21:11

## 2017-12-14 RX ADMIN — DIGOXIN 250 MCG: 0.25 INJECTION INTRAMUSCULAR; INTRAVENOUS at 08:03

## 2017-12-14 RX ADMIN — CALCIUM CHLORIDE 1 G: 100 INJECTION INTRAVENOUS; INTRAVENTRICULAR at 21:06

## 2017-12-14 RX ADMIN — AMIODARONE HYDROCHLORIDE 200 MG: 200 TABLET ORAL at 21:11

## 2017-12-14 RX ADMIN — FAMOTIDINE 10 MG: 10 INJECTION, SOLUTION INTRAVENOUS at 08:06

## 2017-12-14 RX ADMIN — TRAMADOL HYDROCHLORIDE 50 MG: 50 TABLET, FILM COATED ORAL at 22:02

## 2017-12-14 RX ADMIN — VANCOMYCIN HYDROCHLORIDE 1000 MG: 1 INJECTION, SOLUTION INTRAVENOUS at 00:56

## 2017-12-14 RX ADMIN — MAGNESIUM SULFATE HEPTAHYDRATE 1 G: 1 INJECTION, SOLUTION INTRAVENOUS at 05:53

## 2017-12-14 RX ADMIN — VANCOMYCIN HYDROCHLORIDE 1000 MG: 1 INJECTION, SOLUTION INTRAVENOUS at 14:13

## 2017-12-14 ASSESSMENT — PAIN SCALES - GENERAL
PAINLEVEL_OUTOF10: 0
PAINLEVEL_OUTOF10: 6
PAINLEVEL_OUTOF10: 0
PAINLEVEL_OUTOF10: 2
PAINLEVEL_OUTOF10: 2

## 2017-12-14 ASSESSMENT — PAIN DESCRIPTION - LOCATION: LOCATION: CHEST;INCISION

## 2017-12-14 ASSESSMENT — PAIN DESCRIPTION - PAIN TYPE: TYPE: ACUTE PAIN;SURGICAL PAIN

## 2017-12-14 ASSESSMENT — PULMONARY FUNCTION TESTS
PIF_VALUE: 11
PIF_VALUE: 12

## 2017-12-14 ASSESSMENT — PAIN DESCRIPTION - ORIENTATION: ORIENTATION: MID

## 2017-12-14 NOTE — CARE COORDINATION
Spoke with patient regarding discharge planning. Patient is lethargic and not sure where she would like to go at discharge. Patient has been provided with SNF list.  Await choice    [id number] spoke with patient's daughter in law Evelyn while visiting regarding discharge plan. They are planning home with home care.   Await choice

## 2017-12-14 NOTE — PROGRESS NOTES
[facility]    Progress Note    12/14/2017    12:06 PM    Name:   Brittni Ernst  MRN:     [id number]     Acct:      [de-identified]   Room:   [address] [place] [address] Day:  3  Admit Date:  12/11/2017  1:30 AM    PCP:   Sonia Salcido DO  Code Status:  Full Code    Subjective:     C/C: Chest pain. Is s/p CABGx4 -12/13 and  found to have EF 25-30% . Was confused/aggitated last night and self extubated around [address] well on 4 lit NC O2. Received 1 UPRBC last night per RN. hb improved from 7.3 to 8.0. On epi and insulin ggt, has CTs to suction, Rt IJ Pearson rosa, Left SC central line. Brief History:   82 y.o.  Non-/non  female with no significant PMH presents with chest pain that started few hours PTA after a trip to grocery store. Substernal with radiation over the entire chest 6-8/10 severe, 'pressure like' . Lasted for 4-5 hrs after which she decided to drive herself to [facility] .   CT chest showed no PE, +b/l mediastinal lymphadenopathy. Since Troponin was trending up she was transferred here this morning. Trop max >40. Cath done EF 25% with multivessel CAD. Review of Systems:     Unreliable. Medications:      Allergies:  No Known Allergies    Current Meds:   Scheduled Meds:    carvedilol  3.125 mg Oral BID WC    [START ON 12/15/2017] insulin lispro  0-3 Units Subcutaneous Nightly    [START ON 12/15/2017] insulin lispro  0-6 Units Subcutaneous TID     famotidine  10 mg Oral BID    amiodarone  200 mg Oral TID    therapeutic multivitamin-minerals  1 tablet Oral Daily with breakfast    simvastatin  20 mg Oral Nightly    aspirin  81 mg Oral Daily    aspirin  150 mg Rectal Once    clopidogrel  75 mg Oral Daily    vancomycin (VANCOCIN) IV  1,000 mg Intravenous Q12H    digoxin  250 mcg Intravenous Daily    sodium chloride  250 mL Intravenous Once     Continuous Infusions:    sodium chloride 75 mL/hr at

## 2017-12-14 NOTE — PROGRESS NOTES
Occupational Therapy   Occupational Therapy Initial Assessment  Date: 2017   Patient Name: Jamie Forrest  MRN: [id number]     : 1935    Copied from cardiothoracic note filed 17 at 5:16pm:  Pre-operative Diagnosis: Coronary Artery Disease  Post-operative Diagnosis: Same,   Findings: EF 25-30 %  Procedure:  OP CABG X 4, LIMA to LAD, SVG to D,  SVG to Ramus and OM in seq, EVH, Intra Op US Guidance  MAL by anesthesia    Patient Diagnosis(es): There were no encounter diagnoses. has a past medical history of NSTEMI (non-ST elevated myocardial infarction) ([facility] [address] has a past surgical history that includes joint replacement; Cardiac catheterization (2017); and Cataract removal (Bilateral).     Restrictions  Restrictions/Precautions  Restrictions/Precautions: General Precautions, Fall Risk, Surgical Protocols  Required Braces or Orthoses?: No  Position Activity Restriction  Sternal Precautions: No Pushing, No Pulling, 5# Lifting Restrictions  Other position/activity restrictions: CABG x4     Subjective   General  Patient assessed for rehabilitation services?: Yes  Family / Caregiver Present: No  Pain Assessment  Patient Currently in Pain: Yes  Pain Assessment: 0-10  Pain Level: 2  Pain Intervention(s): Medication (see eMar)  Response to Pain Intervention: Asleep with RR greater than 10     Social/Functional History  Social/Functional History  Lives With: Alone  Type of Home: House  Home Layout: One level  Home Access: Stairs to enter with rails  Entrance Stairs - Number of Steps: 4  Entrance Stairs - Rails: Both  Bathroom Shower/Tub: Tub/Shower unit  Bathroom Equipment:  (pt states she is getting a shower chair)  Bathroom Accessibility: Accessible  Home Equipment: [employer]  ADL Assistance: Independent  Homemaking Assistance: Independent  Homemaking Responsibilities: Yes  Ambulation Assistance: Independent  Transfer Assistance: Independent  Additional Comments: Pt states \"I guess\" when asked if friends/

## 2017-12-14 NOTE — PROGRESS NOTES
Physical Therapy    Facility/Department: [facility]  Initial Assessment    NAME: Carlton Malave  : 1935  MRN: [id number]    Copied from cardiothoracic note filed 17 at 5:16pm:  Pre-operative Diagnosis: Coronary Artery Disease  Post-operative Diagnosis: Same,   Findings: EF 25-30 %  Procedure:  OP CABG X 4, LIMA to LAD, SVG to D,  SVG to Ramus and OM in seq, EVH, Intra Op US Guidance  MAL by anesthesia      Date of Service: 2017    Patient Diagnosis(es): There were no encounter diagnoses. has a past medical history of NSTEMI (non-ST elevated myocardial infarction) ([facility] [address] has a past surgical history that includes joint replacement; Cardiac catheterization (2017); and Cataract removal (Bilateral). Restrictions  Restrictions/Precautions  Restrictions/Precautions: General Precautions, Fall Risk, Surgical Protocols  Required Braces or Orthoses?: No  Position Activity Restriction  Sternal Precautions: No Pushing, No Pulling, 5# Lifting Restrictions  Other position/activity restrictions: CABG x4   Vision/Hearing  Vision: Within Functional Limits  Hearing: Within functional limits     Subjective  General  Patient assessed for rehabilitation services?: Yes  Response To Previous Treatment: Not applicable  Family / Caregiver Present: No  Follows Commands: Within Functional Limits  General Comment  Comments: OT co-eval  Subjective  Subjective: RN and pt agreeable to PT.  Pt alert in bed upon arrival  Pain Screening  Patient Currently in Pain: Yes  Pain Assessment  Pain Level: 2  Vital Signs  Patient Currently in Pain: Yes  Pre Treatment Pain Screening  Intervention List: Patient able to continue with treatment    Orientation  Orientation  Overall Orientation Status: Within Functional Limits (drowsy)    Social/Functional History  Social/Functional History  Lives With: Alone  Type of Home: House  Home Layout: One level  Home Access: Stairs to enter with rails  Entrance Stairs - Number of Steps: 4  Entrance Stairs - Rails: Both  Bathroom Shower/Tub: Tub/Shower unit  Bathroom Equipment:  (pt states she is getting a shower chair)  Bathroom Accessibility: Accessible  Home Equipment: [employer]  ADL Assistance: Independent  Homemaking Assistance: Independent  Homemaking Responsibilities: Yes  Ambulation Assistance: Independent  Transfer Assistance: Independent  Additional Comments: Pt states \"I guess\" when asked if friends/ family can help here and there. Objective          AROM RLE (degrees)  RLE AROM: WFL  AROM LLE (degrees)  LLE AROM : WFL  AROM RUE (degrees)  RUE AROM : WFL  AROM LUE (degrees)  LUE AROM : WFL  Strength RLE  Strength RLE: WFL  Strength LLE  Strength LLE: WFL  Strength RUE  Comment: 3+  Strength LUE  Comment: 3+  Strength Other  Other: questionable accuracy, pt drowsy     Sensation  Overall Sensation Status: WNL (Pt denies any numbness/ tingling)  Bed mobility  Supine to Sit: Moderate assistance (x2)  Sit to Supine:  (left in chair)  Scooting: Minimal assistance  Transfers  Sit to Stand: Contact guard assistance  Stand to sit: Minimal Assistance  Ambulation  Ambulation?: Yes  Ambulation 1  Surface: level tile  Device: Rolling Walker  Assistance: Contact guard assistance (declining to modA)  Quality of Gait: pt initially ambulating well, then slowed verbal response and following commands. modA face to face to back to chair and sit, max cues without and pt following commands throughout but decreased. Pt denied dizziness throughout. Distance: 4  Stairs/Curb  Stairs?: No     Balance  Posture: Fair  Sitting - Static: Good  Sitting - Dynamic: -  Standing - Static: Fair;+ (RW)  Standing - Dynamic: Fair (RW)  Exercises  Quad Sets: 15x Bilat  Gluteal Sets: 15x Bilat  Ankle Pumps: 15x Bilat     Assessment   Body structures, Functions, Activity limitations: Decreased functional mobility ; Decreased cognition;Decreased strength;Decreased endurance;Decreased balance;Decreased coordination  Assessment: amb 4' Minutes Carry Indra Horne , [place]

## 2017-12-14 NOTE — PROGRESS NOTES
12/13/17 2258   Vent Information   Vent Type Servo i   Vent Mode SIMV/PRVC   Vt Ordered 450 mL   Vt Exhaled 417 mL   Rate Set 14 bmp   Rate Measured 14 br/min   Minute Volume 6 Liters   Pressure Support 8 cmH20   FiO2  30 %   Peak Inspiratory Pressure 20 cmH2O   Sensitivity 5   PEEP/CPAP 5     Pt placed back on SIMV/PRVC +PS.  Weaning trial @ 0600 per Dr. Hugo Sandoval

## 2017-12-14 NOTE — PROGRESS NOTES
Physical Therapy  Facility/Department: [facility] CAR 1  Daily Treatment Note  NAME: Flaca Song  : 1935  MRN: [id number]    Date of Service: 2017  Exercise sheet issued this visit, all questions answered. Patient Diagnosis(es):   Patient Active Problem List    Diagnosis Date Noted    Anemia, blood loss 2017    CAD, multiple vessel 2017    Confusion 2017    Pure hypercholesterolemia 2017    MI, acute, non ST segment elevation ([facility] [address] 12/10/2017       Past Medical History:   Diagnosis Date    NSTEMI (non-ST elevated myocardial infarction) ([facility] [address] 2017     Past Surgical History:   Procedure Laterality Date    CARDIAC CATHETERIZATION  2017    DR Nelsy Mosqueda   [facility] CATARACT REMOVAL Bilateral     CORONARY ARTERY BYPASS GRAFT N/A 2017    CABG CORONARY ARTERY BYPASS x 4 OFF PUMP, SWAN, MAL performed by Yaniv Bruno MD at [address]      total right knee       Restrictions  Restrictions/Precautions  Restrictions/Precautions: General Precautions, Fall Risk, Surgical Protocols  Required Braces or Orthoses?: No  Position Activity Restriction  Sternal Precautions: No Pushing, No Pulling, 5# Lifting Restrictions  Other position/activity restrictions: CABG x4   Subjective   General  Chart Reviewed: Yes  Response To Previous Treatment: Patient with no complaints from previous session. Family / Caregiver Present: No  Subjective  Subjective: RN and pt agreeable to PT. Pt alert in chair upon arrival  General Comment  Comments: OT co-eval  Pain Screening  Patient Currently in Pain: Yes  Pain Assessment  Pain Assessment: 0-10  Pain Level: 2  Pain Type: Acute pain;Surgical pain  Pain Location: Chest;Incision  Pain Orientation: Mid  Pain Intervention(s): Ambulation/Increased activity; Emotional support  Response to Pain Intervention: Patient Satisfied  Vital Signs  Patient Currently in Pain: Yes  Pre Treatment Pain Screening  Intervention List: Patient able to continue with treatment    Orientation  Orientation  Overall Orientation Status: Within Functional Limits  Objective   Bed mobility  Supine to Sit: Moderate assistance (x2)  Sit to Supine:  (left in chair)  Scooting: Minimal assistance  Transfers  Sit to Stand: Minimal Assistance  Stand to sit: Contact guard assistance  Ambulation  Ambulation?: Yes  Ambulation 1  Surface: level tile  Device: Rolling Walker  Assistance: Minimal assistance  Quality of Gait: assistance to direct walker, pt ambulating very fast in spurts. grossly steady  Distance: 80'  Stairs/Curb  Stairs?: No     Balance  Posture: Fair  Sitting - Static: Good  Sitting - Dynamic: Good  Standing - Static: Good;-  Standing - Dynamic: Fair;+  Comments: RW standing balance  Exercises  Quad Sets: 15x Bilat  Gluteal Sets: 15x Bilat  Ankle Pumps: 15x Bilat  Comments: seated 15x Bilat: LAQ, marches, ankle rocks, punches AAROM, shld flexion to 90deg (chronic shld problems)   AROM RLE (degrees)  RLE AROM: WFL  AROM LLE (degrees)  LLE AROM : WFL  AROM RUE (degrees)  RUE AROM : WFL  RUE General AROM: 90 dge shld flexion, chronic  AROM LUE (degrees)  LUE AROM : WFL  LUE General AROM: 90deg shld flexion, chronic  Strength RLE  Strength RLE: WFL  Strength LLE  Strength LLE: WFL  Strength RUE  Comment: 3+  Strength LUE  Comment: 3+  Strength Other  Other: questionable accuracy, pt drowsy                 Assessment   Body structures, Functions, Activity limitations: Decreased functional mobility ; Decreased endurance;Decreased strength  Assessment: amb 80' to RW CGA with spurts of very fast santiago. limited by fatigue, endurance  Prognosis: Good  Decision Making: Medium Complexity  Patient Education: PT POC  REQUIRES PT FOLLOW UP: Yes  Activity Tolerance  Activity Tolerance: Patient limited by fatigue;Patient limited by endurance; Patient Tolerated treatment well  Activity Tolerance: PT reports only pain during transfer, no dizziness.  Pt declined during ambulation from CGA to modA. Pt slowed in responses and following direction, so modA face to face to direct to chair with pt still assisting and responding though delayed. PT Equipment Recommendations  Other: TBD       Discharge Recommendations:       G-Code  PT G-Codes  Functional Assessment Tool Used: [place] tool  Score: 10  Functional Limitation: Mobility: Walking and moving around  Mobility: Walking and Moving Around Current Status (): At least 60 percent but less than 80 percent impaired, limited or restricted  Mobility: Walking and Moving Around Goal Status (): At least 40 percent but less than 60 percent impaired, limited or restricted  OutComes Score                                                    AM-PAC Score             Goals  Short term goals  Time Frame for Short term goals: 20 visits  Short term goal 1: Pt will be I with bed mobility  Short term goal 2: Pt will be I with transfers  Short term goal 3: Pt will be I with amb 300' without AD  Short term goal 4: Pt will navigate 5 steps Renato with either or both rails.     Plan    Plan  Times per week: 7x/wk BID  Current Treatment Recommendations: Strengthening, Transfer Training, Endurance Training, Gait Training, Balance Training, Functional Mobility Training, Stair training, Home Exercise Program, Safety Education & Training, Patient/Caregiver Education & Training, Equipment Evaluation, Education, & procurement  Safety Devices  Type of devices: Call light within reach, Left in chair, Patient at risk for falls, Gait belt, Nurse notified  Restraints  Initially in place: No     Therapy Time   Individual Concurrent Group Co-treatment   Time In 1515         Time Out 1542         Minutes [address] [place] [address]

## 2017-12-14 NOTE — PLAN OF CARE
Problem: Pain:  Goal: Pain level will decrease  Pain level will decrease   Outcome: Ongoing    Goal: Control of acute pain  Control of acute pain   Outcome: Ongoing      Problem: SAFETY  Goal: Free from accidental physical injury  Outcome: Ongoing    Goal: Free from intentional harm  Outcome: Ongoing      Problem: DAILY CARE  Goal: Daily care needs are met  Outcome: Ongoing      Problem: PAIN  Goal: Patient's pain/discomfort is manageable  Outcome: Ongoing      Problem: SKIN INTEGRITY  Goal: Skin integrity is maintained or improved  Outcome: Ongoing      Problem: KNOWLEDGE DEFICIT  Goal: Patient/S.O. demonstrates understanding of disease process, treatment plan, medications, and discharge instructions. Outcome: Ongoing      Problem: DISCHARGE BARRIERS  Goal: Patient's continuum of care needs are met  Outcome: Ongoing      Problem: Falls - Risk of  Goal: Absence of falls  Outcome: Ongoing      Problem: Risk for Impaired Skin Integrity  Goal: Tissue integrity - skin and mucous membranes  Structural intactness and normal physiological function of skin and  mucous membranes.    Outcome: Ongoing      Problem: OXYGENATION/RESPIRATORY FUNCTION  Goal: Patient will maintain patent airway  Outcome: Ongoing    Goal: Patient will achieve/maintain normal respiratory rate/effort  Respiratory rate and effort will be within normal limits for the patient   Outcome: Ongoing

## 2017-12-14 NOTE — PLAN OF CARE
Problem: Pain:  Goal: Pain level will decrease  Pain level will decrease   Outcome: Met This Shift  Patient satisfied with pain control. PRN Fentanyl, Ultram ordered. Continue pain assessments. Problem: Falls - Risk of  Goal: Absence of falls  Outcome: Met This Shift  Patient at risk for falls. Fall preventative measures in place. Problem: Risk for Impaired Skin Integrity  Goal: Tissue integrity - skin and mucous membranes  Structural intactness and normal physiological function of skin and  mucous membranes. Outcome: Met This Shift  Patient with decreased mobility related to surgery and bedrest. Continue skin preventative measures. Patient shows no s/s of skin breakdown.

## 2017-12-14 NOTE — PLAN OF CARE
[facility]    Second Visit Note  For more detailed information please refer to the progress note of the day      12/14/2017    6:28 PM    Name:   Jacinto Wahl  MRN:     [id number]     Acct:      [de-identified]   Room:   1009/1009-01   Day:  3  Admit Date:  12/11/2017  1:30 AM    PCP:   Eva Mnedes DO  Code Status:  Full Code        Pt vitals were reviewed   New labs were reviewed   Patient was seen. Up to chair . More oriented now. Following commands appropriately. -continue supportive care.      Reyes Winkler MD  12/14/2017  6:28 PM

## 2017-12-14 NOTE — PROGRESS NOTES
incision      Assessment/Plan:   Patient Active Problem List   Diagnosis    MI, acute, non ST segment elevation ([address]    Pure hypercholesterolemia    Intermittent confusion    CAD, multiple vessel       1. S/p CABG X 4, LIMA to LAD, SVG to D,  SVG to Ramus and OM in seq, EVH POD 1.   2. HD - NSR, stable on epi, will wean off. Pt needs ACE/ARB before d/c. Will start Coreg 3.125 BID, Amio 200 TID,   3. Pulmo - stable, lasix  4. GI - ADT, +BS  5.  - slight decrease in UOP 20/hr, also pt +ve 4000, will give her lasix, remove barr later  6. Neuro - grossly normal, pain controlled  ABLA - Hb 8, on unit PRBC, and PLTs given postop, PLT- 115    Discontinue Alines,   DVT ppx: SCD's while stationary  Patient is on BB, ASA, Statin therapy per protocol   Plan for discharge rehab    The above recommendations including medications and orders were discussed and agreed upon with  the attending on service for the cardiothoracic surgery group today.     Electronically signed by MELODY Gandhi on 12/14/2017 at 6:52 AM

## 2017-12-15 PROBLEM — B95.2 ENTEROCOCCUS UTI: Status: ACTIVE | Noted: 2017-12-15

## 2017-12-15 PROBLEM — N39.0 ENTEROCOCCUS UTI: Status: ACTIVE | Noted: 2017-12-15

## 2017-12-15 LAB
ABO/RH: NORMAL
ANION GAP SERPL CALCULATED.3IONS-SCNC: 6 MMOL/L (ref 9–17)
ANTIBODY SCREEN: NEGATIVE
ARM BAND NUMBER: NORMAL
BLD PROD TYP BPU: NORMAL
BUN BLDV-MCNC: 12 MG/DL (ref 8–23)
BUN/CREAT BLD: ABNORMAL (ref 9–20)
CALCIUM IONIZED: 1.23 MMOL/L (ref 1.13–1.33)
CALCIUM SERPL-MCNC: 8.7 MG/DL (ref 8.6–10.4)
CHLORIDE BLD-SCNC: 101 MMOL/L (ref 98–107)
CO2: 29 MMOL/L (ref 20–31)
CREAT SERPL-MCNC: 0.83 MG/DL (ref 0.5–0.9)
CROSSMATCH RESULT: NORMAL
DISPENSE STATUS BLOOD BANK: NORMAL
EXPIRATION DATE: NORMAL
GFR AFRICAN AMERICAN: >60 ML/MIN
GFR NON-AFRICAN AMERICAN: >60 ML/MIN
GFR SERPL CREATININE-BSD FRML MDRD: ABNORMAL ML/MIN/{1.73_M2}
GFR SERPL CREATININE-BSD FRML MDRD: ABNORMAL ML/MIN/{1.73_M2}
GLUCOSE BLD-MCNC: 102 MG/DL (ref 65–105)
GLUCOSE BLD-MCNC: 103 MG/DL (ref 65–105)
GLUCOSE BLD-MCNC: 109 MG/DL (ref 65–105)
GLUCOSE BLD-MCNC: 110 MG/DL (ref 65–105)
GLUCOSE BLD-MCNC: 121 MG/DL (ref 65–105)
GLUCOSE BLD-MCNC: 123 MG/DL (ref 65–105)
GLUCOSE BLD-MCNC: 124 MG/DL (ref 70–99)
GLUCOSE BLD-MCNC: 150 MG/DL (ref 65–105)
HCT VFR BLD CALC: 29.1 % (ref 36.3–47.1)
HEMOGLOBIN: 9.3 G/DL (ref 11.9–15.1)
MAGNESIUM: 2.4 MG/DL (ref 1.6–2.6)
MCH RBC QN AUTO: 30.4 PG (ref 25.2–33.5)
MCHC RBC AUTO-ENTMCNC: 32 G/DL (ref 28.4–34.8)
MCV RBC AUTO: 95.1 FL (ref 82.6–102.9)
PDW BLD-RTO: 15.5 % (ref 11.8–14.4)
PLATELET # BLD: 98 K/UL (ref 138–453)
PMV BLD AUTO: 11.9 FL (ref 8.1–13.5)
POTASSIUM SERPL-SCNC: 5.2 MMOL/L (ref 3.7–5.3)
RBC # BLD: 3.06 M/UL (ref 3.95–5.11)
SODIUM BLD-SCNC: 136 MMOL/L (ref 135–144)
TRANSFUSION STATUS: NORMAL
UNIT DIVISION: 0
UNIT NUMBER: NORMAL
WBC # BLD: 10.3 K/UL (ref 3.5–11.3)

## 2017-12-15 PROCEDURE — 97116 GAIT TRAINING THERAPY: CPT

## 2017-12-15 PROCEDURE — 80048 BASIC METABOLIC PNL TOTAL CA: CPT

## 2017-12-15 PROCEDURE — 94762 N-INVAS EAR/PLS OXIMTRY CONT: CPT

## 2017-12-15 PROCEDURE — 6370000000 HC RX 637 (ALT 250 FOR IP): Performed by: INTERNAL MEDICINE

## 2017-12-15 PROCEDURE — 82947 ASSAY GLUCOSE BLOOD QUANT: CPT

## 2017-12-15 PROCEDURE — 97535 SELF CARE MNGMENT TRAINING: CPT

## 2017-12-15 PROCEDURE — 6370000000 HC RX 637 (ALT 250 FOR IP): Performed by: NURSE PRACTITIONER

## 2017-12-15 PROCEDURE — 97110 THERAPEUTIC EXERCISES: CPT

## 2017-12-15 PROCEDURE — 99024 POSTOP FOLLOW-UP VISIT: CPT | Performed by: PHYSICIAN ASSISTANT

## 2017-12-15 PROCEDURE — 99232 SBSQ HOSP IP/OBS MODERATE 35: CPT | Performed by: INTERNAL MEDICINE

## 2017-12-15 PROCEDURE — 82330 ASSAY OF CALCIUM: CPT

## 2017-12-15 PROCEDURE — 83735 ASSAY OF MAGNESIUM: CPT

## 2017-12-15 PROCEDURE — 85027 COMPLETE CBC AUTOMATED: CPT

## 2017-12-15 PROCEDURE — 2140000001 HC CVICU INTERMEDIATE R&B

## 2017-12-15 PROCEDURE — 6370000000 HC RX 637 (ALT 250 FOR IP): Performed by: THORACIC SURGERY (CARDIOTHORACIC VASCULAR SURGERY)

## 2017-12-15 PROCEDURE — 36415 COLL VENOUS BLD VENIPUNCTURE: CPT

## 2017-12-15 RX ORDER — DIGOXIN 125 MCG
125 TABLET ORAL DAILY
Status: DISCONTINUED | OUTPATIENT
Start: 2017-12-15 | End: 2017-12-20 | Stop reason: HOSPADM

## 2017-12-15 RX ORDER — DOCUSATE SODIUM 100 MG/1
100 CAPSULE, LIQUID FILLED ORAL 2 TIMES DAILY
Status: DISCONTINUED | OUTPATIENT
Start: 2017-12-15 | End: 2017-12-19

## 2017-12-15 RX ORDER — FUROSEMIDE 20 MG/1
20 TABLET ORAL 2 TIMES DAILY
Status: DISCONTINUED | OUTPATIENT
Start: 2017-12-15 | End: 2017-12-15

## 2017-12-15 RX ORDER — POTASSIUM CHLORIDE 20 MEQ/1
20 TABLET, EXTENDED RELEASE ORAL 2 TIMES DAILY
Status: DISCONTINUED | OUTPATIENT
Start: 2017-12-15 | End: 2017-12-16

## 2017-12-15 RX ORDER — BISACODYL 10 MG
10 SUPPOSITORY, RECTAL RECTAL DAILY PRN
Status: DISCONTINUED | OUTPATIENT
Start: 2017-12-15 | End: 2017-12-20 | Stop reason: HOSPADM

## 2017-12-15 RX ORDER — NITROFURANTOIN 25; 75 MG/1; MG/1
100 CAPSULE ORAL EVERY 12 HOURS SCHEDULED
Status: DISCONTINUED | OUTPATIENT
Start: 2017-12-15 | End: 2017-12-18

## 2017-12-15 RX ORDER — POLYETHYLENE GLYCOL 3350 17 G/17G
17 POWDER, FOR SOLUTION ORAL DAILY
Status: DISCONTINUED | OUTPATIENT
Start: 2017-12-15 | End: 2017-12-19

## 2017-12-15 RX ADMIN — AMIODARONE HYDROCHLORIDE 200 MG: 200 TABLET ORAL at 20:05

## 2017-12-15 RX ADMIN — ACETAMINOPHEN 650 MG: 325 TABLET ORAL at 09:42

## 2017-12-15 RX ADMIN — CLOPIDOGREL 75 MG: 75 TABLET, FILM COATED ORAL at 09:34

## 2017-12-15 RX ADMIN — INSULIN LISPRO 1 UNITS: 100 INJECTION, SOLUTION INTRAVENOUS; SUBCUTANEOUS at 22:00

## 2017-12-15 RX ADMIN — NITROFURANTOIN MONOHYDRATE/MACROCRYSTALLINE 100 MG: 25; 75 CAPSULE ORAL at 20:05

## 2017-12-15 RX ADMIN — AMIODARONE HYDROCHLORIDE 200 MG: 200 TABLET ORAL at 09:35

## 2017-12-15 RX ADMIN — POTASSIUM CHLORIDE 20 MEQ: 1500 TABLET, EXTENDED RELEASE ORAL at 20:05

## 2017-12-15 RX ADMIN — DOCUSATE SODIUM 100 MG: 100 CAPSULE ORAL at 13:05

## 2017-12-15 RX ADMIN — POTASSIUM CHLORIDE 20 MEQ: 1500 TABLET, EXTENDED RELEASE ORAL at 09:35

## 2017-12-15 RX ADMIN — POLYETHYLENE GLYCOL 3350 17 G: 17 POWDER, FOR SOLUTION ORAL at 13:05

## 2017-12-15 RX ADMIN — FAMOTIDINE 10 MG: 20 TABLET, FILM COATED ORAL at 20:05

## 2017-12-15 RX ADMIN — CARVEDILOL 3.12 MG: 3.12 TABLET, FILM COATED ORAL at 09:36

## 2017-12-15 RX ADMIN — DOCUSATE SODIUM 100 MG: 100 CAPSULE ORAL at 20:05

## 2017-12-15 RX ADMIN — SIMVASTATIN 20 MG: 20 TABLET, FILM COATED ORAL at 20:05

## 2017-12-15 RX ADMIN — AMIODARONE HYDROCHLORIDE 200 MG: 200 TABLET ORAL at 13:04

## 2017-12-15 RX ADMIN — MULTIPLE VITAMINS W/ MINERALS TAB 1 TABLET: TAB at 09:34

## 2017-12-15 RX ADMIN — FUROSEMIDE 20 MG: 20 TABLET ORAL at 09:35

## 2017-12-15 RX ADMIN — DIGOXIN 125 MCG: 0.12 TABLET ORAL at 09:34

## 2017-12-15 RX ADMIN — FAMOTIDINE 10 MG: 20 TABLET, FILM COATED ORAL at 09:34

## 2017-12-15 RX ADMIN — ASPIRIN 81 MG: 81 TABLET, COATED ORAL at 09:35

## 2017-12-15 ASSESSMENT — PAIN DESCRIPTION - LOCATION: LOCATION: BACK

## 2017-12-15 ASSESSMENT — PAIN SCALES - GENERAL
PAINLEVEL_OUTOF10: 0
PAINLEVEL_OUTOF10: 3
PAINLEVEL_OUTOF10: 5

## 2017-12-15 ASSESSMENT — PAIN DESCRIPTION - PAIN TYPE: TYPE: SURGICAL PAIN

## 2017-12-15 NOTE — PLAN OF CARE
Problem: Pain:  Goal: Pain level will decrease  Pain level will decrease   Outcome: Ongoing      Problem: SAFETY  Goal: Free from accidental physical injury  Outcome: Ongoing      Problem: Falls - Risk of  Goal: Absence of falls  Outcome: Ongoing      Problem: Risk for Impaired Skin Integrity  Goal: Tissue integrity - skin and mucous membranes  Structural intactness and normal physiological function of skin and  mucous membranes.    Outcome: Ongoing

## 2017-12-15 NOTE — PROGRESS NOTES
[facility] Cardiothoracic Surgeons  Progress Note    12/15/2017 10:08 AM  Surgeon:  Sandy Wells MD          POD# 2     S/P :  CABG X 4, LIMA to LAD, SVG to D,  SVG to Ramus and OM in seq, EVH  EF: 35 % post op    Subjective:  Ms. Dionisio Bravo resting on bed. Pt received one unit PRBC yesterday. Pain controlled. . Ambulated yest.. no Bm post op, encouraged appetite, Gtt none    Vital Signs: /66   Pulse 82   Temp 97.9 °F (36.6 °C) (Oral)   Resp 16   Ht 5' (1.524 m)   Wt 188 lb 15 oz (85.7 kg)   SpO2 99%   BMI 36.90 kg/m²  O2 Flow Rate (L/min): 2 L/min   Admit Weight: Weight: 182 lb 15.7 oz (83 kg)   WEIGHTWeight: 188 lb 15 oz (85.7 kg)     Rhythm: NSR    Labs:   CBC:   Recent Labs      12/13/17 1744 12/14/17   0514  12/14/17   1822  12/15/17   0509   WBC  18.9*   --   10.6   --   10.3   HGB  7.3*   < >  8.0*  7.6*  9.3*   HCT  22.6*   < >  24.5*  24.2*  29.1*   MCV  99.1   --   92.5   --   95.1   PLT  See Reflexed IPF Result   --   See Reflexed IPF Result   --   98*    < > = values in this interval not displayed. BMP:   Recent Labs      12/13/17 1744 12/14/17   0112  12/14/17   0514  12/15/17   0509   NA  147*   --    --   140  136   K  3.3*   < >  5.0  4.9  5.2   CL  110*   --    --   107  101   CO2  24   --    --   24  29   BUN  11   --    --   11  12   CREATININE  0.73   --    --   0.84  0.83    < > = values in this interval not displayed. PT/INR: No results for input(s): PROTIME, INR in the last 72 hours. APTT:   Recent Labs      12/12/17   1802   APTT  24.7           I/O:  I/O last 3 completed shifts: In: 1924.1 [P.O.:150; I.V.:1313.3; Blood:370.8; Other:90]  Out: 2365 [Urine:1855;  Chest Tube:510]  Air Leak:  No air leak     Scheduled Meds:    digoxin  125 mcg Oral Daily    furosemide  20 mg Oral BID    potassium chloride  20 mEq Oral BID    carvedilol  3.125 mg Oral BID WC    insulin lispro  0-3 Units Subcutaneous Nightly    insulin lispro  0-6 Units Subcutaneous TID WC    famotidine  10 mg cardiothoracic surgery group today.     Electronically signed by MELODY Groves on 12/15/2017 at 10:08 AM

## 2017-12-15 NOTE — PROGRESS NOTES
Occupational Therapy  Facility/Department: [facility] CAR 1  Daily Treatment Note  NAME: Lincoln Hernandez  : 1935  MRN: [id number]    Date of Service: 12/15/2017    Patient Diagnosis(es): There were no encounter diagnoses. has a past medical history of NSTEMI (non-ST elevated myocardial infarction) ([facility] [address] has a past surgical history that includes joint replacement; Cardiac catheterization (2017); Cataract removal (Bilateral); and Coronary artery bypass graft (N/A, 2017). Restrictions  Restrictions/Precautions  Restrictions/Precautions: General Precautions, Fall Risk, Surgical Protocols  Required Braces or Orthoses?: No  Position Activity Restriction  Sternal Precautions: No Pushing, No Pulling, 5# Lifting Restrictions  Other position/activity restrictions: CABG x4 /sternal prec  Subjective   General  Patient assessed for rehabilitation services?: Yes  Family / Caregiver Present: No  Pain Assessment  Patient Currently in Pain: Yes  Pain Assessment: 0-10  Pain Level: 5  Pain Type: Surgical pain  Pain Location: Back  Pain Intervention(s): Medication (see eMar);Repositioned; Ambulation/Increased activity; Emotional support  Response to Pain Intervention: Drowsy; Patient Satisfied  Multiple Pain Sites: No  Vital Signs  Patient Currently in Pain: Yes   Orientation  Orientation  Overall Orientation Status: Within Functional Limits  Objective    ADL  Grooming: Setup;Minimal assistance;Verbal cueing; Increased time to complete  LE Bathing: Setup;Maximum assistance;Verbal cueing  UE Dressing: Setup; Moderate assistance  LE Dressing: Setup;Maximum assistance  Toileting: Setup;Maximum assistance; Increased time to complete  Additional Comments: pt very drowsy and voided on self and need LB self care and toileted on the toilet too. Sx bra placed per RN request- max A to place. pt very unsteady and poor walker safety. Pt stood at sink for hand hygiene before retiring to recliner to sleep.  pt impulsive and drowsy on

## 2017-12-15 NOTE — CARE COORDINATION
Spoke with daughter, Josue Kaur. They plan on their mom going home at d/c. The family will stay with her 24/7 until not needed anymore. Agreeable to home care, chose [address] Call to [address], spoke with Owen. Will accept.   Face sheet and H/P faxed

## 2017-12-15 NOTE — PROGRESS NOTES
[facility]    Progress Note    12/15/2017    1:00 PM    Name:   Paris Thomas  MRN:     [id number]     Acct:      [de-identified]   Room:   [address] [place] [address] Day:  4  Admit Date:  12/11/2017  1:30 AM    PCP:   Sayra Stauffer DO  Code Status:  Full Code    Subjective:     C/C: Chest pain. Improving well. Off drips. Up to chair this morning. Has central line in place. Brief History:   82 y.o.  Non-/non  female with no significant PMH presents with chest pain that started few hours PTA after a trip to grocery store. Substernal with radiation over the entire chest 6-8/10 severe, 'pressure like' . Lasted for 4-5 hrs after which she decided to drive herself to [facility] .   CT chest showed no PE, +b/l mediastinal lymphadenopathy. Since Troponin was trending up she was transferred here this morning. Trop max >40. Cath done EF 25% with multivessel CAD and  s/p CABGx4 -12/13 and  found to have EF 25-30% . Review of Systems:     Constitutional:  negative for chills, fevers, sweats  Respiratory:  negative for cough, d shortness of breath, wheezing  Cardiovascular:  reports pain over surgical site but otherwise well. Gastrointestinal:  negative for abdominal pain, constipation, diarrhea, nausea, vomiting  Neurological:  negative for dizziness, headache.        Medications:      Allergies:  No Known Allergies    Current Meds:   Scheduled Meds:    digoxin  125 mcg Oral Daily    furosemide  20 mg Oral BID    potassium chloride  20 mEq Oral BID    docusate sodium  100 mg Oral BID    polyethylene glycol  17 g Oral Daily    nitrofurantoin (macrocrystal-monohydrate)  100 mg Oral 2 times per day    carvedilol  3.125 mg Oral BID WC    insulin lispro  0-3 Units Subcutaneous Nightly    insulin lispro  0-6 Units Subcutaneous TID WC    famotidine  10 mg Oral BID    amiodarone  200 mg Oral TID    therapeutic multivitamin-minerals  1 tablet Oral Daily with breakfast    simvastatin  20 mg Oral Nightly    aspirin  81 mg Oral Daily    aspirin  150 mg Rectal Once    clopidogrel  75 mg Oral Daily    sodium chloride  250 mL Intravenous Once     Continuous Infusions:    dextrose       PRN Meds: bisacodyl, sodium chloride flush, calcium chloride IVPB, magnesium sulfate, potassium chloride, acetaminophen, fentanNYL, diphenhydrAMINE, magnesium hydroxide, ondansetron, metoclopramide, hydrALAZINE, glucose, dextrose, glucagon (rDNA), dextrose, traMADol    Data:     Past Medical History:   has a past medical history of NSTEMI (non-ST elevated myocardial infarction) ([facility] [address] Social History:   reports that she has never smoked. She does not have any smokeless tobacco history on file. Family History: History reviewed. No pertinent family history. Vitals:  BP (!) 91/40   Pulse 67   Temp 97.5 °F (36.4 °C) (Oral)   Resp 18   Ht 5' (1.524 m)   Wt 188 lb 15 oz (85.7 kg)   SpO2 98%   BMI 36.90 kg/m²   Temp (24hrs), Av.7 °F (36.5 °C), Min:97.2 °F (36.2 °C), Max:97.9 °F (36.6 °C)    Recent Labs      12/15/17   0304  12/15/17   0609  12/15/17   0626  12/15/17   1114   POCGLU  102  109*  103  121*       I/O (24Hr): Intake/Output Summary (Last 24 hours) at 12/15/17 1300  Last data filed at 12/15/17 0949   Gross per 24 hour   Intake          2219.08 ml   Output             1365 ml   Net           854.08 ml       Physical Examination:        General appearance:  alert, up in chair. No distress. Mental Status: oriented to person, place and time . Following commands appropriately   Lungs: b/l air entry+ minimal basal wheeze. Heart:  Abdomen:  soft, nontender, normal bowel sounds. Extremities:  multiple bruises noted over extremities.    Assessment:        Primary Problem  CAD, multiple vessel    Active Hospital Problems    Diagnosis Date Noted    Anemia, blood loss [D50.0] 2017     Priority: High    CAD, multiple vessel [I25.10] 2017 Priority: High    Enterococcus UTI [N39.0, B95.2] 12/15/2017    Confusion [R41.0] 12/12/2017    Pure hypercholesterolemia [E78.00] 12/11/2017    MI, acute, non ST segment elevation ([facility] [address] [I21.4] 12/10/2017     Plan:        Doing well post op. Empiric oral antibiotics for UTI. Home soon depending on CT recs.       Frederick Rodriguez MD  12/15/2017  1:00 PM

## 2017-12-15 NOTE — PLAN OF CARE
[facility]    Second Visit Note  For more detailed information please refer to the progress note of the day      12/15/2017    5:45 PM    Name:   Lele Key  MRN:     [id number]     Acct:      [de-identified]   Room:   [facility]/1009-01   Day:  4  Admit Date:  12/11/2017  1:30 AM    PCP:   Simran Siddiqui DO  Code Status:  Full Code        Pt vitals were reviewed   New labs were reviewed   Patient was seen    Updated plan :     1. Home when ok with CTsx .          Emeli Orta MD  12/15/2017  5:45 PM

## 2017-12-15 NOTE — PROGRESS NOTES
Progress Note    Patient Name:  Pete Giles    :  1935  12/15/2017 5:05 PM      SUBJECTIVE       Ms. Carl Russo  has no chest pain, shortness of breath, palpitations, nausea or vomiting      OBJECTIVE     Vital signs:    BP (!) 88/40   Pulse 71   Temp 97.7 °F (36.5 °C) (Oral)   Resp 16   Ht 5' (1.524 m)   Wt 188 lb 15 oz (85.7 kg)   SpO2 97%   BMI 36.90 kg/m²  2 L/min      Admit Weight:  182 lb 15.7 oz (83 kg)    Last 3 weights: Wt Readings from Last 3 Encounters:   12/15/17 188 lb 15 oz (85.7 kg)       BMI: Body mass index is 36.9 kg/m². Input/Output:       Intake/Output Summary (Last 24 hours) at 12/15/17 1705  Last data filed at 12/15/17 1300   Gross per 24 hour   Intake          2099.08 ml   Output             1385 ml   Net           714.08 ml         Exam:     General appearance: awake and alert moves all ext   Lungs: no rhonchi, no wheezes, no rales  Heart: S1 and S2 no murmur  Abdomen: positive bowel sounds, no bruits, no masses  Extremities: warm and dry, no cyanosis, no clubbing        Laboratory Studies:     CBC: Recent Labs      174   17   0514  17   1822  12/15/17   0509   WBC  18.9*   --   10.6   --   10.3   HGB  7.3*   < >  8.0*  7.6*  9.3*   HCT  22.6*   < >  24.5*  24.2*  29.1*   MCV  99.1   --   92.5   --   95.1   PLT  See Reflexed IPF Result   --   See Reflexed IPF Result   --   98*    < > = values in this interval not displayed. BMP: Recent Labs      17   17417   0112  17   0514  12/15/17   0509   NA  147*   --    --   140  136   K  3.3*   < >  5.0  4.9  5.2   CL  110*   --    --   107  101   CO2  24   --    --   24  29   BUN  11   --    --   11  12   CREATININE  0.73   --    --   0.84  0.83    < > = values in this interval not displayed. PT/INR: No results for input(s): PROTIME, INR in the last 72 hours.   APTT:   Recent Labs      17   1802   APTT  24.7     MAG:   Recent Labs      17   1744  17   0514 12/15/17   0509   MG  1.4*  1.9  2.4     D Dimer: No results for input(s): DDIMER in the last 72 hours. Troponin  No results for input(s): TROPONINI in the last 72 hours. No results for input(s): TROPONINT in the last 72 hours. BNP No results for input(s): BNP in the last 72 hours. No results for input(s): PROBNP in the last 72 hours. Pulse Ox: SpO2  Av.5 %  Min: 90 %  Max: 100 %  Supplemental O2: O2 Flow Rate (L/min): 2 L/min     Current Meds:    digoxin  125 mcg Oral Daily    potassium chloride  20 mEq Oral BID    docusate sodium  100 mg Oral BID    polyethylene glycol  17 g Oral Daily    nitrofurantoin (macrocrystal-monohydrate)  100 mg Oral 2 times per day    carvedilol  3.125 mg Oral BID WC    insulin lispro  0-3 Units Subcutaneous Nightly    insulin lispro  0-6 Units Subcutaneous TID WC    famotidine  10 mg Oral BID    amiodarone  200 mg Oral TID    therapeutic multivitamin-minerals  1 tablet Oral Daily with breakfast    simvastatin  20 mg Oral Nightly    aspirin  81 mg Oral Daily    aspirin  150 mg Rectal Once    clopidogrel  75 mg Oral Daily    sodium chloride  250 mL Intravenous Once     Continuous Infusions:    dextrose              ASSESSMENT     Principal Problem:    CAD, multiple vessel  Active Problems:    MI, acute, non ST segment elevation (HCC)    Pure hypercholesterolemia    Confusion    Anemia, blood loss    Enterococcus UTI      PLAN       [de-identified] yo female S/p CABG X 4, LIMA to LAD, SVG to D,  SVG to Ramus and OM in [facility], EVH POD #2. EF35% post op. Making steady progress.         Electronically signed by Christiano Molina MD on 12/15/2017 at 5:05 PM

## 2017-12-15 NOTE — PROGRESS NOTES
Physical Therapy  Facility/Department: [facility] CAR 1  Daily Treatment Note  NAME: Yesenia Ground  : 1935  MRN: [id number]    Date of Service: 12/15/2017    Patient Diagnosis(es):   Patient Active Problem List    Diagnosis Date Noted    Anemia, blood loss 2017    CAD, multiple vessel 2017    Confusion 2017    Pure hypercholesterolemia 2017    MI, acute, non ST segment elevation ([facility] [address] [address] 12/10/2017       Past Medical History:   Diagnosis Date    NSTEMI (non-ST elevated myocardial infarction) ([facility] [address] 2017     Past Surgical History:   Procedure Laterality Date    CARDIAC CATHETERIZATION  2017    DR Linda Mazariegos   [place] CATARACT REMOVAL Bilateral     CORONARY ARTERY BYPASS GRAFT N/A 2017    CABG CORONARY ARTERY BYPASS x 4 OFF PUMP, SWAN, MAL performed by Gilberto Whiteside MD at [address]      total right knee       Restrictions  Restrictions/Precautions  Restrictions/Precautions: General Precautions, Fall Risk, Surgical Protocols  Required Braces or Orthoses?: No  Position Activity Restriction  Sternal Precautions: No Pushing, No Pulling, 5# Lifting Restrictions  Other position/activity restrictions: CABG x4   Subjective    Pt sitting up in her chair sleeping upon arrival. Pt appears to be sensitive with her pain meds. Pt stated she was unable to lift her head off the pillow and needed my assistance. Pt has no c/o pain   Orientation    Overall Orientation Status: Within Functional Limits  Objective     Transfers  Sit to Stand: CGA  Stand to sit: Contact guard assistance   Comment: Verbal cues for hand placement with all transfers. Ambulation  Ambulation?: Yes  Ambulation 1  Surface: level tile  Device: Rolling Walker  Assistance: Minimal assistance  Quality of Gait: Min.assistance to direct walker, pt ambulating very fast in spurts.  grossly steady  Distance: 300' x 1 with one seated rest and 1 standing rest.  Stairs/Curb  Stairs?: No     Balance  Posture: Fair  Sitting - Static: Good  Sitting - Dynamic: Good  Standing - Static: Good;-  Standing - Dynamic: Fair;+  Comments: RW standing balance  Exercises    Seated LE exercise program: Long Arc Quads,heel/toe raises, and marches. Reps: 20 x each with 2 lb wt on B LE's. Assessment     Body structures, Functions, Activity limitations: Decreased functional mobility ; Decreased endurance;Decreased strength  Assessment: amb 300' x 1 with one seated rest and 1 standing rest break, RW,2L 02, CGA with spurts of very fast santiago. limited by fatigue, endurance. Pt fell asleep immediately after doing LE ex's. Prognosis: Good  Decision Making: Medium Complexity  Patient Education: PT POC  REQUIRES PT FOLLOW UP: Yes  Activity Tolerance  Activity Tolerance: Patient limited by fatigue;Patient limited by endurance; Patient Tolerated treatment well  Activity Tolerance:  Pt slowed in responses and following directions. PT Equipment Recommendations  Other: TBD     Goals  Short term goals  Time Frame for Short term goals: 20 visits  Short term goal 1: Pt will be I with bed mobility  Short term goal 2: Pt will be I with transfers  Short term goal 3: Pt will be I with amb 300' without AD  Short term goal 4: Pt will navigate 5 steps Renato with either or both rails.     Plan    Plan  Times per week: 7x/wk BID  Current Treatment Recommendations: Strengthening, Transfer Training, Endurance Training, Gait Training, Balance Training, Functional Mobility Training, Stair training, Home Exercise Program, Safety Education & Training, Patient/Caregiver Education & Training, Equipment Evaluation, Education, & procurement  Safety Devices  Type of devices: Call light within reach, Left in chair, Patient at risk for falls, Gait belt, Nurse notified  Restraints  Initially in place: No     Therapy Time   Individual Concurrent Group Co-treatment   Time In 1030         Time Out 1100         Minutes  [address] [place] [address]

## 2017-12-15 NOTE — PLAN OF CARE
Problem: SAFETY  Goal: Free from accidental physical injury  Outcome: Ongoing    Goal: Free from intentional harm  Outcome: Ongoing      Problem: DAILY CARE  Goal: Daily care needs are met  Outcome: Ongoing      Problem: PAIN  Goal: Patient's pain/discomfort is manageable  Outcome: Ongoing      Problem: SKIN INTEGRITY  Goal: Skin integrity is maintained or improved  Outcome: Ongoing      Problem: KNOWLEDGE DEFICIT  Goal: Patient/S.O. demonstrates understanding of disease process, treatment plan, medications, and discharge instructions. Outcome: Ongoing      Problem: DISCHARGE BARRIERS  Goal: Patient's continuum of care needs are met  Outcome: Ongoing      Problem: Falls - Risk of  Goal: Absence of falls  Outcome: Ongoing      Problem: Risk for Impaired Skin Integrity  Goal: Tissue integrity - skin and mucous membranes  Structural intactness and normal physiological function of skin and  mucous membranes.    Outcome: Ongoing      Problem: OXYGENATION/RESPIRATORY FUNCTION  Goal: Patient will maintain patent airway  Outcome: Ongoing    Goal: Patient will achieve/maintain normal respiratory rate/effort  Respiratory rate and effort will be within normal limits for the patient   Outcome: Ongoing      Problem: MECHANICAL VENTILATION  Goal: Patient will maintain patent airway  Outcome: Completed Date Met: 12/15/17    Goal: Oral health is maintained or improved  Outcome: Completed Date Met: 12/15/17    Goal: ET tube will be managed safely  Outcome: Completed Date Met: 12/15/17      Problem: Musculor/Skeletal Functional Status  Goal: Highest potential functional level  Outcome: Ongoing      Problem: Musculor/Skeletal Functional Status  Goal: Highest potential functional level  Outcome: Ongoing    Goal: Absence of falls  Outcome: Ongoing

## 2017-12-15 NOTE — FLOWSHEET NOTE
Spiritual Service Consult:  Pt seen per [facility] re: [facility] 12/13. Pt was sitting in chair when  stopped. She said that she still had a lot of pain where her incision is. She told  that she had CABG x4. She accepted 's offer of prayer. Chaplains will remain available to offer spiritual and emotional support as needed. Rev. Shirin Gresham, [address]     12/14/17 2051   Encounter Summary   Services provided to: Patient   Referral/Consult From: Multi-disciplinary team  ([facility])   Support System Children;Family members   Continue Visiting (12/14)   Complexity of Encounter Low   Length of Encounter 15 minutes   Routine   Type Post-procedure   Assessment Calm; Approachable;Coping  (feeling pain in her incisions)   Intervention Sustaining presence/ Ministry of presence; Active listening;Discussed illness/injury and it's impact; Explored feelings, thoughts, concerns;Nurtured hope;Prayer   Outcome Receptive; Acceptance; Coping;Encouraged;Comfort;Expressed gratitude   Spiritual/Restorationism   Type Spiritual support

## 2017-12-16 LAB
ANION GAP SERPL CALCULATED.3IONS-SCNC: 12 MMOL/L (ref 9–17)
BUN BLDV-MCNC: 16 MG/DL (ref 8–23)
BUN/CREAT BLD: ABNORMAL (ref 9–20)
CALCIUM SERPL-MCNC: 8.5 MG/DL (ref 8.6–10.4)
CHLORIDE BLD-SCNC: 102 MMOL/L (ref 98–107)
CO2: 24 MMOL/L (ref 20–31)
CREAT SERPL-MCNC: 0.76 MG/DL (ref 0.5–0.9)
CULTURE: ABNORMAL
CULTURE: ABNORMAL
GFR AFRICAN AMERICAN: >60 ML/MIN
GFR NON-AFRICAN AMERICAN: >60 ML/MIN
GFR SERPL CREATININE-BSD FRML MDRD: ABNORMAL ML/MIN/{1.73_M2}
GFR SERPL CREATININE-BSD FRML MDRD: ABNORMAL ML/MIN/{1.73_M2}
GLUCOSE BLD-MCNC: 110 MG/DL (ref 65–105)
GLUCOSE BLD-MCNC: 111 MG/DL (ref 65–105)
GLUCOSE BLD-MCNC: 120 MG/DL (ref 70–99)
GLUCOSE BLD-MCNC: 124 MG/DL (ref 65–105)
GLUCOSE BLD-MCNC: 153 MG/DL (ref 65–105)
HCT VFR BLD CALC: 27.8 % (ref 36.3–47.1)
HEMOGLOBIN: 8.8 G/DL (ref 11.9–15.1)
Lab: ABNORMAL
MAGNESIUM: 2.3 MG/DL (ref 1.6–2.6)
MCH RBC QN AUTO: 30.8 PG (ref 25.2–33.5)
MCHC RBC AUTO-ENTMCNC: 31.7 G/DL (ref 28.4–34.8)
MCV RBC AUTO: 97.2 FL (ref 82.6–102.9)
ORGANISM: ABNORMAL
PDW BLD-RTO: 15.4 % (ref 11.8–14.4)
PLATELET # BLD: 117 K/UL (ref 138–453)
PMV BLD AUTO: 12.3 FL (ref 8.1–13.5)
POTASSIUM SERPL-SCNC: 5.4 MMOL/L (ref 3.7–5.3)
RBC # BLD: 2.86 M/UL (ref 3.95–5.11)
SODIUM BLD-SCNC: 138 MMOL/L (ref 135–144)
SPECIMEN DESCRIPTION: ABNORMAL
STATUS: ABNORMAL
WBC # BLD: 10.6 K/UL (ref 3.5–11.3)

## 2017-12-16 PROCEDURE — 2140000001 HC CVICU INTERMEDIATE R&B

## 2017-12-16 PROCEDURE — 97110 THERAPEUTIC EXERCISES: CPT

## 2017-12-16 PROCEDURE — 6370000000 HC RX 637 (ALT 250 FOR IP): Performed by: INTERNAL MEDICINE

## 2017-12-16 PROCEDURE — 99232 SBSQ HOSP IP/OBS MODERATE 35: CPT | Performed by: INTERNAL MEDICINE

## 2017-12-16 PROCEDURE — 6370000000 HC RX 637 (ALT 250 FOR IP): Performed by: THORACIC SURGERY (CARDIOTHORACIC VASCULAR SURGERY)

## 2017-12-16 PROCEDURE — 85027 COMPLETE CBC AUTOMATED: CPT

## 2017-12-16 PROCEDURE — 97535 SELF CARE MNGMENT TRAINING: CPT

## 2017-12-16 PROCEDURE — 6370000000 HC RX 637 (ALT 250 FOR IP): Performed by: NURSE PRACTITIONER

## 2017-12-16 PROCEDURE — 94762 N-INVAS EAR/PLS OXIMTRY CONT: CPT

## 2017-12-16 PROCEDURE — 36415 COLL VENOUS BLD VENIPUNCTURE: CPT

## 2017-12-16 PROCEDURE — 82947 ASSAY GLUCOSE BLOOD QUANT: CPT

## 2017-12-16 PROCEDURE — 80048 BASIC METABOLIC PNL TOTAL CA: CPT

## 2017-12-16 PROCEDURE — 99024 POSTOP FOLLOW-UP VISIT: CPT | Performed by: PHYSICIAN ASSISTANT

## 2017-12-16 PROCEDURE — 83735 ASSAY OF MAGNESIUM: CPT

## 2017-12-16 PROCEDURE — 97116 GAIT TRAINING THERAPY: CPT

## 2017-12-16 RX ORDER — POTASSIUM CHLORIDE 20 MEQ/1
20 TABLET, EXTENDED RELEASE ORAL DAILY
Status: DISCONTINUED | OUTPATIENT
Start: 2017-12-17 | End: 2017-12-17

## 2017-12-16 RX ADMIN — CLOPIDOGREL 75 MG: 75 TABLET, FILM COATED ORAL at 08:53

## 2017-12-16 RX ADMIN — DIPHENHYDRAMINE HCL 25 MG: 25 TABLET ORAL at 02:32

## 2017-12-16 RX ADMIN — SIMVASTATIN 20 MG: 20 TABLET, FILM COATED ORAL at 20:02

## 2017-12-16 RX ADMIN — NITROFURANTOIN MONOHYDRATE/MACROCRYSTALLINE 100 MG: 25; 75 CAPSULE ORAL at 08:52

## 2017-12-16 RX ADMIN — MULTIPLE VITAMINS W/ MINERALS TAB 1 TABLET: TAB at 08:53

## 2017-12-16 RX ADMIN — DIGOXIN 125 MCG: 0.12 TABLET ORAL at 08:53

## 2017-12-16 RX ADMIN — DOCUSATE SODIUM 100 MG: 100 CAPSULE ORAL at 20:02

## 2017-12-16 RX ADMIN — ASPIRIN 81 MG: 81 TABLET, COATED ORAL at 08:52

## 2017-12-16 RX ADMIN — NITROFURANTOIN MONOHYDRATE/MACROCRYSTALLINE 100 MG: 25; 75 CAPSULE ORAL at 20:02

## 2017-12-16 RX ADMIN — POTASSIUM CHLORIDE 20 MEQ: 1500 TABLET, EXTENDED RELEASE ORAL at 08:52

## 2017-12-16 RX ADMIN — INSULIN LISPRO 1 UNITS: 100 INJECTION, SOLUTION INTRAVENOUS; SUBCUTANEOUS at 14:03

## 2017-12-16 RX ADMIN — AMIODARONE HYDROCHLORIDE 200 MG: 200 TABLET ORAL at 14:05

## 2017-12-16 RX ADMIN — FAMOTIDINE 10 MG: 20 TABLET, FILM COATED ORAL at 20:02

## 2017-12-16 RX ADMIN — CARVEDILOL 3.12 MG: 3.12 TABLET, FILM COATED ORAL at 08:52

## 2017-12-16 RX ADMIN — AMIODARONE HYDROCHLORIDE 200 MG: 200 TABLET ORAL at 20:02

## 2017-12-16 RX ADMIN — TRAMADOL HYDROCHLORIDE 50 MG: 50 TABLET, FILM COATED ORAL at 02:32

## 2017-12-16 RX ADMIN — FAMOTIDINE 10 MG: 20 TABLET, FILM COATED ORAL at 08:51

## 2017-12-16 RX ADMIN — POLYETHYLENE GLYCOL 3350 17 G: 17 POWDER, FOR SOLUTION ORAL at 08:52

## 2017-12-16 RX ADMIN — TRAMADOL HYDROCHLORIDE 50 MG: 50 TABLET, FILM COATED ORAL at 09:21

## 2017-12-16 RX ADMIN — AMIODARONE HYDROCHLORIDE 200 MG: 200 TABLET ORAL at 08:51

## 2017-12-16 RX ADMIN — DOCUSATE SODIUM 100 MG: 100 CAPSULE ORAL at 08:52

## 2017-12-16 ASSESSMENT — PAIN DESCRIPTION - PAIN TYPE
TYPE: ACUTE PAIN;SURGICAL PAIN
TYPE: SURGICAL PAIN

## 2017-12-16 ASSESSMENT — PAIN SCALES - GENERAL
PAINLEVEL_OUTOF10: 8
PAINLEVEL_OUTOF10: 10

## 2017-12-16 ASSESSMENT — PAIN DESCRIPTION - ORIENTATION
ORIENTATION: LOWER
ORIENTATION: RIGHT

## 2017-12-16 ASSESSMENT — PAIN DESCRIPTION - LOCATION
LOCATION: CHEST
LOCATION: CHEST;INCISION

## 2017-12-16 ASSESSMENT — PAIN DESCRIPTION - ONSET: ONSET: GRADUAL

## 2017-12-16 ASSESSMENT — PAIN DESCRIPTION - DESCRIPTORS: DESCRIPTORS: DISCOMFORT

## 2017-12-16 ASSESSMENT — PAIN DESCRIPTION - FREQUENCY: FREQUENCY: INTERMITTENT

## 2017-12-16 NOTE — PROGRESS NOTES
Balance  Sitting Balance: Independent  Standing Balance: Contact guard assistance (w/SW)  Standing Balance  Sit to stand: Minimal assistance  Stand to sit: Minimal assistance   Transfers  Stand Step Transfers: Contact guard assistance (w/RW)- pt unsteady noted and cues for safety/slow down. Pt is impulsive noted. Sit to stand: Minimal assistance  Stand to sit: Minimal assistance  Attendance  Participation: Active participation     Assessment   Pt is not safe to return home on this date and  would benefit from continued OT therapy service d/t  Performance deficits / Impairments: Decreased functional mobility ; Decreased endurance;Decreased ADL status; Decreased safe awareness;Decreased high-level IADLs, decreased cognition. Prognosis: Good  Patient Education: Reviewed on OT services/POC, transfer safety, walker safety, safety awareness, bed mob, sternal prec, EC/WS, importance of calling out for RN and bed/chair alarms for safety, fair/good return  REQUIRES OT FOLLOW UP: Yes  Activity Tolerance  Activity Tolerance: Patient Tolerated treatment well  Safety Devices  Safety Devices in place: Yes  Type of devices: All fall risk precautions in place;Call light within reach; Chair alarm in place; Left in chair        Plan   Plan  Times per week: 4-5x/wk   Current Treatment Recommendations: Functional Mobility Training, Endurance Training, Safety Education & Training, Self-Care / ADL, Patient/Caregiver Education & Training, Equipment Evaluation, Education, & procurement       Goals  Short term goals  Time Frame for Short term goals: by discharge, pt will  Short term goal 1: demo I in UE ADL activites   Short term goal 2: demo MI/ I in LE ADL activites with AD as needed  Short term goal 3: demo understanding and I use of EC/WS, sternal precautions, proper pursed lipped breathing and fall prevention tech to assist with ADL/ functional activites  Short term goal 4: demo understanding and I use of safe transfer tech during functional activites with use of RW  Short term goal 5: demo increased activity tolerance of 20+ min to assist with ADL/ functional activites        Therapy Time   Individual Concurrent Group Co-treatment   Time In  8:10         Time Out  9:09         [address]

## 2017-12-16 NOTE — PLAN OF CARE
Wendy [facility]man [facility]    Second Visit Note  For more detailed information please refer to the progress note of the day      12/16/2017    6:13 PM    Name:   Kathleen Hilliard  MRN:     [id number]     Acct:      [de-identified]   Room:   [facility] [place]   Day:  5  Admit Date:  12/11/2017  1:30 AM    PCP:   Melvina Han,   Code Status:  Full Code    Pt vitals were reviewed   New labs were reviewed   Patient was seen. BP in 90-100s. A. Flutter on monitor. Patient asymptomatic. Continue amiodarone per cardiology/CTsx.      Maximino Elaine MD  12/16/2017  6:13 PM

## 2017-12-16 NOTE — PROGRESS NOTES
nitrofurantoin (macrocrystal-monohydrate)  100 mg Oral 2 times per day    carvedilol  3.125 mg Oral BID WC    insulin lispro  0-3 Units Subcutaneous Nightly    insulin lispro  0-6 Units Subcutaneous TID WC    famotidine  10 mg Oral BID    amiodarone  200 mg Oral TID    therapeutic multivitamin-minerals  1 tablet Oral Daily with breakfast    simvastatin  20 mg Oral Nightly    aspirin  81 mg Oral Daily    aspirin  150 mg Rectal Once    clopidogrel  75 mg Oral Daily    sodium chloride  250 mL Intravenous Once     Continuous Infusions:    dextrose         Exam:   General: alert and oriented to person, place and time, well-developed and well-nourished, in no acute distress  Heart:Normal S1 and S2.  Regular rhythm. No murmurs, gallops, or rubs. , Pacing wires  No  Lungs: clear to auscultation bilaterally and diminished breath sounds bibasilar Equal and symmetric expansion with respiration. Chest tubes to suction  Abdomen: soft, non tender, non distended, BSx4  Extremities: 2+ edema  Musculoskeletal:  Intact range of motion of peripheral joints. grossly normal  Neurologic:  Non-focal sensory deficits on exam.  Psychiatric: Mood and affect are appropriate. Wounds: clean and dry, healing appropriately Pravena to MS      Assessment/Plan:   Patient Active Problem List   Diagnosis    MI, acute, non ST segment elevation ([facility]    Pure hypercholesterolemia    Confusion    CAD, multiple vessel    Anemia, blood loss    Enterococcus UTI       1. S/p CABG X 4, LIMA to LAD, SVG to D,  SVG to Ramus and OM in seq, EVH   POD 3 . A flutter ventricular rate in 120s, controlled - pt on Coreg, Digoxin, Amio Po. Cardio consult. Cont current meds. No cardioversion  ABLA - on unit PRBC transfused postop.  HB 9.3, PLT low 98 - will hold off Lovenox    DVT ppx: Lovenox & SCD's while stationary  Patient is on BB,  ASA, Statin therapy per protocol needs ACE/ARB before d/c  Plan for discharge rehab/home    The above recommendations including medications and orders were discussed and agreed upon with  the attending on service for the cardiothoracic surgery group today.     Electronically signed by MELODY Gandhi on 12/16/2017 at 1:42 PM

## 2017-12-16 NOTE — PROGRESS NOTES
Wendy [facility]    Progress Note    12/16/2017    1:52 PM    Name:   Shannon Erickson  MRN:     [id number]     Acct:      [de-identified]   Room:   [address] [place] [id number] Day:  5  Admit Date:  12/11/2017  1:30 AM    PCP:   Ewa Edwards DO  Code Status:  Full Code    Subjective:     C/C: Chest pain. Had an episode of arrhythmia with hypotension SBP in 80s. Asymptomatic per RN. Up to chair this morning. Has central line Left SC . Patient denies any complaints. Labs K- 5.4,   Brief History:   82 y.o.  Non-/non  female with no significant PMH presents with chest pain that started few hours PTA after a trip to grocery store. Substernal with radiation over the entire chest 6-8/10 severe, 'pressure like' . Lasted for 4-5 hrs after which she decided to drive herself to [facility] .   CT chest showed no PE, +b/l mediastinal lymphadenopathy. Since Troponin was trending up she was transferred here this morning. Trop max >40. Cath done EF 25% with multivessel CAD and  s/p CABGx4 -12/13 and  found to have EF 25-30% . Urine cx sent from barr placed preop showed Gr D enterococcus. Started on empiric abx. Review of Systems:     Constitutional:  negative for chills, fevers, sweats  Respiratory:  negative for cough, d shortness of breath, wheezing  Cardiovascular:  reports pain over surgical site but otherwise well. Gastrointestinal:  negative for abdominal pain, constipation, diarrhea, nausea, vomiting  Neurological:  negative for dizziness, headache.      Medications:      Allergies:  No Known Allergies    Current Meds:   Scheduled Meds:    digoxin  125 mcg Oral Daily    potassium chloride  20 mEq Oral BID    docusate sodium  100 mg Oral BID    polyethylene glycol  17 g Oral Daily    nitrofurantoin (macrocrystal-monohydrate)  100 mg Oral 2 times per day    carvedilol  3.125 mg Oral BID     insulin lispro  0-3 Units Subcutaneous vessel    Active Hospital Problems    Diagnosis Date Noted    Anemia, blood loss [D50.0] 12/14/2017     Priority: High    CAD, multiple vessel [I25.10] 12/13/2017     Priority: High    Enterococcus UTI [N39.0, B95.2] 12/15/2017    Confusion [R41.0] 12/12/2017    Pure hypercholesterolemia [E78.00] 12/11/2017    MI, acute, non ST segment elevation ([facility] [I21.4] 12/10/2017     Plan:        Decrease Oral potassium to once daily, continue oral antibiotics.      Reynaldo Brody MD  12/16/2017  1:52 PM

## 2017-12-16 NOTE — PROGRESS NOTES
Physical Therapy  Facility/Department: [facility] CAR 1  Daily Treatment Note  NAME: Sushant Calvin  : 1935  MRN: [id number]    Date of Service: 2017    Patient Diagnosis(es):   Patient Active Problem List    Diagnosis Date Noted    Enterococcus UTI 12/15/2017    Anemia, blood loss 2017    CAD, multiple vessel 2017    Confusion 2017    Pure hypercholesterolemia 2017    MI, acute, non ST segment elevation ([facility] 12/10/2017       Past Medical History:   Diagnosis Date    NSTEMI (non-ST elevated myocardial infarction) ([facility] 2017     Past Surgical History:   Procedure Laterality Date    CARDIAC CATHETERIZATION  2017    DR Steph Wilkeskinson CATARACT REMOVAL Bilateral     CORONARY ARTERY BYPASS GRAFT N/A 2017    CABG CORONARY ARTERY BYPASS x 4 OFF PUMP, SWAN, MAL performed by Stephanie Bhatt MD at [address]      total right knee       Restrictions  Restrictions/Precautions  Restrictions/Precautions: General Precautions, Fall Risk, Surgical Protocols  Required Braces or Orthoses?: No  Position Activity Restriction  Sternal Precautions: No Pushing, No Pulling, 5# Lifting Restrictions  Other position/activity restrictions: CABG x4 /sternal prec  Subjective   General  Chart Reviewed: Yes  Response To Previous Treatment: Patient reporting fatigue but able to participate. Subjective  Subjective: RN and pt agreeable to PT. Pt sleeping in chair upon arrival  General Comment  Comments: Pt with low BP upon arrival 78/58, RN notified.  RN requests seated exs only at this time  Pain Screening  Patient Currently in Pain: Denies  Vital Signs  Patient Currently in Pain: Denies       Orientation  Orientation  Overall Orientation Status: Within Functional Limits  Objective   Bed mobility  Supine to Sit: Unable to assess  Comment: pt seated in chair upon arrival  Transfers  Sit to Stand: Unable to assess  Comment: RN requests seated exs only d/t low

## 2017-12-16 NOTE — PLAN OF CARE
Problem: Nutrition  Goal: Optimal nutrition therapy  Outcome: Ongoing  Nutrition Problem: Inadequate oral intake  Intervention: Food and/or Nutrient Delivery: Continue current diet, Continue current ONS  Nutritional Goals: meet % of estimated nutrition needs with oral diet /supplements

## 2017-12-16 NOTE — PLAN OF CARE
Problem: Falls - Risk of  Goal: Absence of falls  Outcome: Ongoing  Pt encouraged to call out for any/all needs. Call light, bedside table, urinal within reach. Bed alarm on.

## 2017-12-16 NOTE — PROGRESS NOTES
assistance  Comment:  Ambulation  Ambulation?: Yes  Ambulation 1  Surface: level tile  Device: Rolling Walker  Other Apparatus: O2  Quality of Gait: quick santiago, slightly flexed posture   Distance: 125ft x2  Comments: 1 seated rest break required between ambs, vc's to navigate RW around obstacles  Stairs/Curb  Stairs?: No     Balance  Posture: Good  Sitting - Static: Good  Sitting - Dynamic: Good  Standing - Static: Good  Comments: RW standing balance  Exercises  Comments: seated 15x Bilat UE/LE exs per CR protocol, shld flexion to 90deg (chronic shld problems)         Assessment   Body structures, Functions, Activity limitations: Decreased functional mobility ; Decreased endurance;Decreased strength  Assessment: pt amb 125' x2 with RW and CGA, requires vc's for RW navigation and saafety. Limited by endurance   Prognosis: Good  Patient Education: reviewed CR exs with pt verbalizing and demonstrating understanding  REQUIRES PT FOLLOW UP: Yes  Activity Tolerance  Activity Tolerance: Patient limited by endurance; Patient Tolerated treatment well  Activity Tolerance: Low BP initially 78/58. Increased to 89/62, RN requesting defer amb to PM session  PT Equipment Recommendations  Other: TBD       Goals  Short term goals  Time Frame for Short term goals: 20 visits  Short term goal 1: Pt will be I with bed mobility  Short term goal 2: Pt will be I with transfers  Short term goal 3: Pt will be I with amb 300' without AD  Short term goal 4: Pt will navigate 5 steps Renato with either or both rails.     Plan    Plan  Times per week: 7x/wk BID  Times per day: Twice a day  Current Treatment Recommendations: Strengthening, Transfer Training, Endurance Training, Gait Training, Balance Training, Functional Mobility Training, Stair training, Home Exercise Program, Safety Education & Training, Patient/Caregiver Education & Training, Equipment Evaluation, Education, & procurement  Safety Devices  Type of devices: Call light within reach,

## 2017-12-17 PROBLEM — I48.92 ATRIAL FLUTTER (HCC): Status: ACTIVE | Noted: 2017-12-17

## 2017-12-17 LAB
AMMONIA: 14 UMOL/L (ref 11–51)
ANION GAP SERPL CALCULATED.3IONS-SCNC: 11 MMOL/L (ref 9–17)
BUN BLDV-MCNC: 18 MG/DL (ref 8–23)
BUN/CREAT BLD: ABNORMAL (ref 9–20)
CALCIUM SERPL-MCNC: 8.9 MG/DL (ref 8.6–10.4)
CHLORIDE BLD-SCNC: 97 MMOL/L (ref 98–107)
CO2: 26 MMOL/L (ref 20–31)
CREAT SERPL-MCNC: 0.74 MG/DL (ref 0.5–0.9)
FOLATE: 13.2 NG/ML
GFR AFRICAN AMERICAN: >60 ML/MIN
GFR NON-AFRICAN AMERICAN: >60 ML/MIN
GFR SERPL CREATININE-BSD FRML MDRD: ABNORMAL ML/MIN/{1.73_M2}
GFR SERPL CREATININE-BSD FRML MDRD: ABNORMAL ML/MIN/{1.73_M2}
GLUCOSE BLD-MCNC: 115 MG/DL (ref 70–99)
GLUCOSE BLD-MCNC: 122 MG/DL (ref 65–105)
GLUCOSE BLD-MCNC: 122 MG/DL (ref 65–105)
GLUCOSE BLD-MCNC: 99 MG/DL (ref 65–105)
GLUCOSE BLD-MCNC: 99 MG/DL (ref 65–105)
HCT VFR BLD CALC: 30.8 % (ref 36.3–47.1)
HEMOGLOBIN: 10 G/DL (ref 11.9–15.1)
MAGNESIUM: 2.2 MG/DL (ref 1.6–2.6)
MCH RBC QN AUTO: 30.4 PG (ref 25.2–33.5)
MCHC RBC AUTO-ENTMCNC: 32.5 G/DL (ref 28.4–34.8)
MCV RBC AUTO: 93.6 FL (ref 82.6–102.9)
PDW BLD-RTO: 15.2 % (ref 11.8–14.4)
PLATELET # BLD: ABNORMAL K/UL (ref 138–453)
PLATELET, FLUORESCENCE: 130 K/UL (ref 138–453)
PLATELET, IMMATURE FRACTION: 4.8 % (ref 1.1–10.3)
PMV BLD AUTO: ABNORMAL FL (ref 8.1–13.5)
POTASSIUM SERPL-SCNC: 5.2 MMOL/L (ref 3.7–5.3)
RBC # BLD: 3.29 M/UL (ref 3.95–5.11)
SODIUM BLD-SCNC: 134 MMOL/L (ref 135–144)
THYROXINE, FREE: 1.16 NG/DL (ref 0.93–1.7)
TSH SERPL DL<=0.05 MIU/L-ACNC: 10.67 MIU/L (ref 0.3–5)
VITAMIN B-12: 751 PG/ML (ref 232–1245)
WBC # BLD: 11.5 K/UL (ref 3.5–11.3)

## 2017-12-17 PROCEDURE — 6370000000 HC RX 637 (ALT 250 FOR IP): Performed by: NURSE PRACTITIONER

## 2017-12-17 PROCEDURE — 99024 POSTOP FOLLOW-UP VISIT: CPT | Performed by: PHYSICIAN ASSISTANT

## 2017-12-17 PROCEDURE — 6370000000 HC RX 637 (ALT 250 FOR IP): Performed by: THORACIC SURGERY (CARDIOTHORACIC VASCULAR SURGERY)

## 2017-12-17 PROCEDURE — 36415 COLL VENOUS BLD VENIPUNCTURE: CPT

## 2017-12-17 PROCEDURE — 85027 COMPLETE CBC AUTOMATED: CPT

## 2017-12-17 PROCEDURE — 6370000000 HC RX 637 (ALT 250 FOR IP): Performed by: INTERNAL MEDICINE

## 2017-12-17 PROCEDURE — 80048 BASIC METABOLIC PNL TOTAL CA: CPT

## 2017-12-17 PROCEDURE — 82140 ASSAY OF AMMONIA: CPT

## 2017-12-17 PROCEDURE — 84443 ASSAY THYROID STIM HORMONE: CPT

## 2017-12-17 PROCEDURE — 82607 VITAMIN B-12: CPT

## 2017-12-17 PROCEDURE — 2140000001 HC CVICU INTERMEDIATE R&B

## 2017-12-17 PROCEDURE — 6370000000 HC RX 637 (ALT 250 FOR IP)

## 2017-12-17 PROCEDURE — 99222 1ST HOSP IP/OBS MODERATE 55: CPT | Performed by: PSYCHIATRY & NEUROLOGY

## 2017-12-17 PROCEDURE — 99232 SBSQ HOSP IP/OBS MODERATE 35: CPT | Performed by: INTERNAL MEDICINE

## 2017-12-17 PROCEDURE — 97110 THERAPEUTIC EXERCISES: CPT

## 2017-12-17 PROCEDURE — 83735 ASSAY OF MAGNESIUM: CPT

## 2017-12-17 PROCEDURE — 82746 ASSAY OF FOLIC ACID SERUM: CPT

## 2017-12-17 PROCEDURE — 94762 N-INVAS EAR/PLS OXIMTRY CONT: CPT

## 2017-12-17 PROCEDURE — 84439 ASSAY OF FREE THYROXINE: CPT

## 2017-12-17 PROCEDURE — 82947 ASSAY GLUCOSE BLOOD QUANT: CPT

## 2017-12-17 PROCEDURE — 2580000003 HC RX 258: Performed by: THORACIC SURGERY (CARDIOTHORACIC VASCULAR SURGERY)

## 2017-12-17 PROCEDURE — 97116 GAIT TRAINING THERAPY: CPT

## 2017-12-17 RX ORDER — OXYCODONE HYDROCHLORIDE AND ACETAMINOPHEN 5; 325 MG/1; MG/1
TABLET ORAL
Status: COMPLETED
Start: 2017-12-17 | End: 2017-12-17

## 2017-12-17 RX ORDER — LEVOTHYROXINE SODIUM 0.05 MG/1
50 TABLET ORAL DAILY
Status: DISCONTINUED | OUTPATIENT
Start: 2017-12-18 | End: 2017-12-20 | Stop reason: HOSPADM

## 2017-12-17 RX ORDER — DIPHENHYDRAMINE HCL 25 MG
TABLET ORAL
Status: DISPENSED
Start: 2017-12-17 | End: 2017-12-17

## 2017-12-17 RX ADMIN — AMIODARONE HYDROCHLORIDE 200 MG: 200 TABLET ORAL at 15:09

## 2017-12-17 RX ADMIN — CLOPIDOGREL 75 MG: 75 TABLET, FILM COATED ORAL at 08:28

## 2017-12-17 RX ADMIN — NITROFURANTOIN MONOHYDRATE/MACROCRYSTALLINE 100 MG: 25; 75 CAPSULE ORAL at 08:28

## 2017-12-17 RX ADMIN — TRAMADOL HYDROCHLORIDE 50 MG: 50 TABLET, FILM COATED ORAL at 20:15

## 2017-12-17 RX ADMIN — NITROFURANTOIN MONOHYDRATE/MACROCRYSTALLINE 100 MG: 25; 75 CAPSULE ORAL at 20:11

## 2017-12-17 RX ADMIN — DIGOXIN 125 MCG: 0.12 TABLET ORAL at 08:28

## 2017-12-17 RX ADMIN — AMIODARONE HYDROCHLORIDE 200 MG: 200 TABLET ORAL at 20:11

## 2017-12-17 RX ADMIN — DIPHENHYDRAMINE HCL 25 MG: 25 TABLET ORAL at 21:56

## 2017-12-17 RX ADMIN — SIMVASTATIN 20 MG: 20 TABLET, FILM COATED ORAL at 20:11

## 2017-12-17 RX ADMIN — ASPIRIN 81 MG: 81 TABLET, COATED ORAL at 08:28

## 2017-12-17 RX ADMIN — FAMOTIDINE 10 MG: 20 TABLET, FILM COATED ORAL at 08:28

## 2017-12-17 RX ADMIN — OXYCODONE HYDROCHLORIDE AND ACETAMINOPHEN 1 TABLET: 5; 325 TABLET ORAL at 02:41

## 2017-12-17 RX ADMIN — CARVEDILOL 3.12 MG: 3.12 TABLET, FILM COATED ORAL at 17:23

## 2017-12-17 RX ADMIN — Medication 10 ML: at 08:29

## 2017-12-17 RX ADMIN — AMIODARONE HYDROCHLORIDE 200 MG: 200 TABLET ORAL at 08:28

## 2017-12-17 RX ADMIN — DOCUSATE SODIUM 100 MG: 100 CAPSULE ORAL at 08:28

## 2017-12-17 RX ADMIN — MULTIPLE VITAMINS W/ MINERALS TAB 1 TABLET: TAB at 08:28

## 2017-12-17 RX ADMIN — DIPHENHYDRAMINE HCL 25 MG: 25 TABLET ORAL at 02:42

## 2017-12-17 RX ADMIN — DOCUSATE SODIUM 100 MG: 100 CAPSULE ORAL at 20:11

## 2017-12-17 RX ADMIN — FAMOTIDINE 10 MG: 20 TABLET, FILM COATED ORAL at 20:12

## 2017-12-17 RX ADMIN — POLYETHYLENE GLYCOL 3350 17 G: 17 POWDER, FOR SOLUTION ORAL at 08:30

## 2017-12-17 RX ADMIN — CARVEDILOL 3.12 MG: 3.12 TABLET, FILM COATED ORAL at 08:28

## 2017-12-17 ASSESSMENT — PAIN SCALES - GENERAL
PAINLEVEL_OUTOF10: 0
PAINLEVEL_OUTOF10: 8
PAINLEVEL_OUTOF10: 5
PAINLEVEL_OUTOF10: 0

## 2017-12-17 NOTE — PROGRESS NOTES
Unable to assess  Comment: pt seated in chair upon arrival and returned to chair after amb  Transfers  Sit to Stand: Contact guard assistance  Stand to sit: Contact guard assistance  Comment: vc's for safety  Ambulation  Ambulation?: Yes  Ambulation 1  Surface: level tile  Device: Rolling Walker  Assistance: Contact guard assistance  Quality of Gait: quick santiago, slightly flexed posture   Distance: 150ft x2  Comments: 1 standing rest break required between ambs, decreased safety awareness, pt is more confused than AM session tells therapist \"we are at your house\"  Stairs/Curb  Stairs?: No     Balance  Posture: Good  Sitting - Static: Good  Sitting - Dynamic: Good  Standing - Static: Good  Standing - Dynamic: Fair;+  Comments: RW standing balance  Exercises  Comments: seated 20x Bilat UE exs per CR protocol         Assessment   Body structures, Functions, Activity limitations: Decreased functional mobility ; Decreased endurance;Decreased strength  Assessment: pt amb 150' x2 with RW and CGA, requires vc's for RW navigation and safety. Limited by endurance. Pt would be unsafe to return home at this time secondary to verbal cues and assist level required with mobility for safety. Prognosis: Good  Patient Education: reviewed CR exs with pt verbalizing and demonstrating understanding  REQUIRES PT FOLLOW UP: Yes  Activity Tolerance  Activity Tolerance: Patient limited by endurance; Patient Tolerated treatment well  Activity Tolerance: increased confusion  PT Equipment Recommendations  Other: TBD           Goals  Short term goals  Time Frame for Short term goals: 20 visits  Short term goal 1: Pt will be I with bed mobility  Short term goal 2: Pt will be I with transfers  Short term goal 3: Pt will be I with amb 300' without AD  Short term goal 4: Pt will navigate 5 steps Renato with either or both rails.     Plan    Plan  Times per week: 7x/wk BID  Times per day: Twice a day  Current Treatment Recommendations: Strengthening, Transfer Training, Endurance Training, Gait Training, Balance Training, Functional Mobility Training, Stair training, Home Exercise Program, Safety Education & Training, Patient/Caregiver Education & Training, Equipment Evaluation, Education, & procurement  Safety Devices  Type of devices: Call light within reach, Left in chair, Patient at risk for falls, Gait belt, Nurse notified, All fall risk precautions in place, Chair alarm in place, Telesitter in use  Restraints  Initially in place: No     Therapy Time   Individual Concurrent Group Co-treatment   Time In 1440         Time Out 1505         Minutes [address]
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
## 2017-12-17 NOTE — PROGRESS NOTES
Bilateral     CORONARY ARTERY BYPASS GRAFT N/A 12/13/2017    CABG CORONARY ARTERY BYPASS x 4 OFF PUMP, SWAN, MAL performed by Olivier Kay MD at [address]      total right knee     Social History: Rica Hansen  reports that she has never smoked. She does not have any smokeless tobacco history on file. History reviewed. No pertinent family history. Current Medications:     diphenhydrAMINE        potassium chloride  20 mEq Oral Daily    digoxin  125 mcg Oral Daily    docusate sodium  100 mg Oral BID    polyethylene glycol  17 g Oral Daily    nitrofurantoin (macrocrystal-monohydrate)  100 mg Oral 2 times per day    carvedilol  3.125 mg Oral BID WC    insulin lispro  0-3 Units Subcutaneous Nightly    insulin lispro  0-6 Units Subcutaneous TID WC    famotidine  10 mg Oral BID    amiodarone  200 mg Oral TID    therapeutic multivitamin-minerals  1 tablet Oral Daily with breakfast    simvastatin  20 mg Oral Nightly    aspirin  81 mg Oral Daily    aspirin  150 mg Rectal Once    clopidogrel  75 mg Oral Daily    sodium chloride  250 mL Intravenous Once     PRN Meds include: bisacodyl, sodium chloride flush, calcium chloride IVPB, magnesium sulfate, potassium chloride, acetaminophen, fentanNYL, diphenhydrAMINE, magnesium hydroxide, ondansetron, metoclopramide, hydrALAZINE, glucose, dextrose, glucagon (rDNA), dextrose, traMADol    ROS:   Constitutional Positive for confusion   HEENT Negative for ear discharge, ear pain, nosebleed   Eyes Negative for photophobia, pain and discharge   Respiratory Negative for hemoptysis and sputum   Cardiovascular Negative for orthopnea, claudication and PND   Gastrointestinal Negative for abdominal pain, diarrhea, blood in stool   Musculoskeletal Negative for joint pain, negative for myalgia   Skin Negative for rash or itching   Endo/heme/allergies Negative for polydipsia, environmental allergy   Psychiatric Negative for suicidal ideation.   Patient is not anxious           Objective:   /60   Pulse 81   Temp 97.9 °F (36.6 °C) (Oral)   Resp 16   Ht 5' (1.524 m)   Wt 189 lb 9.5 oz (86 kg)   SpO2 91%   BMI 37.03 kg/m²     Blood pressure range: Systolic (35VWW), WMZ:204 , Min:105 , QJX:419   ; Diastolic (03YYP), CPI:15, Min:50, Max:69      Continuous infusions:    dextrose         ON EXAMINATION:  GENERAL  Appears comfortable and in no distress   HEENT  NC/ AT   NECK  Supple and no bruits heard   Cardiovascular  S1, S2 heard; radial pulse intact   MENTAL STATUS:  Alert, oriented to her name, not oriented to time place , person and memory, is confused, normal speech, normal language, has hallucinations, no delusion   CRANIAL NERVES: II     -       Pupils reactive b/l visual acuity: Visual fields intact to confrontation  III,IV,VI -  EOMs full, no afferent defect, no                      JAIRO, no ptosis  V     -     Normal facial sensation  VII    -     Normal facial symmetry  VIII   -     Intact hearing  IX,X -     Symmetrical palate  XI    -     Symmetrical shoulder shrug  XII   -     Midline tongue, no atrophy    MOTOR FUNCTION:  Normal bulk, normal tone, normal power;  no involuntary movements, no tremor, Bilateral pitting edema 1 +   SENSORY FUNCTION:  grossly intact    CEREBELLAR FUNCTION:  Intact fine motor control over upper limbs   REFLEX FUNCTION:  Symmetric, no perverted reflex, no Babinski sign   STATION and GAIT  Not tested         Data:    Lab Results:     Lab Results   Component Value Date    CHOL 196 12/12/2017    LDLCHOLESTEROL 103 12/12/2017    HDL 80 12/12/2017    TRIG 63 12/12/2017    ALT 22 12/12/2017    AST 77 (H) 12/12/2017    INR 1.0 12/12/2017    LABA1C 5.7 12/12/2017     Hematology:  Recent Labs      12/15/17   0509  12/16/17   0435  12/17/17   0415   WBC  10.3  10.6  11.5*   HGB  9.3*  8.8*  10.0*   HCT  29.1*  27.8*  30.8*   PLT  98*  117*  See Reflexed IPF Result     Chemistry:  Recent Labs      12/15/17   0509  12/16/17 assessment, plan and orders as documented by the resident with changes made to the note. Patient was seen during the rounds earlier today. Impression and Plan: Ms. Nikole Houston is a [de-identified] y.o. female with   Metabolic encephalopathy with urosepsis  Neurologic exam significant for mild cognitive impairment  Recommend B12, Folate  Continue present antibiotic therapy  Also recommend Seroquel 25mg nightly   Comorbid conditions include CAD, CHF, hyperlipidemia. Will follow with you. Thank you for consultation.        Vidya Tatum MD 12/17/2017 6:34 PM

## 2017-12-17 NOTE — PROGRESS NOTES
Physical Therapy  Facility/Department: [facility] CAR 1  Daily Treatment Note  NAME: Carlton Malave  : 1935  MRN: [id number]    Date of Service: 2017    Patient Diagnosis(es):   Patient Active Problem List    Diagnosis Date Noted    Enterococcus UTI 12/15/2017    Anemia, blood loss 2017    CAD, multiple vessel 2017    Confusion 2017    Pure hypercholesterolemia 2017    MI, acute, non ST segment elevation ([facility] [address] 12/10/2017       Past Medical History:   Diagnosis Date    NSTEMI (non-ST elevated myocardial infarction) ([facility] [address] 2017     Past Surgical History:   Procedure Laterality Date    CARDIAC CATHETERIZATION  2017    DR Xi Arriola Amen CATARACT REMOVAL Bilateral     CORONARY ARTERY BYPASS GRAFT N/A 2017    CABG CORONARY ARTERY BYPASS x 4 OFF PUMP, SWAN, MAL performed by Fern Siemens, MD at [address]      total right knee       Restrictions  Restrictions/Precautions  Restrictions/Precautions: General Precautions, Fall Risk, Surgical Protocols  Required Braces or Orthoses?: No  Position Activity Restriction  Sternal Precautions: No Pushing, No Pulling, 5# Lifting Restrictions  Other position/activity restrictions: CABG x4 /sternal prec  Subjective   General  Chart Reviewed: Yes  Response To Previous Treatment: Patient with no complaints from previous session. Family / Caregiver Present: No  Subjective  Subjective: RN and pt agreeable to PT. Pt sleeping in chair upon arrival, telesitter in use.   Pain Screening  Patient Currently in Pain: Denies  Vital Signs  Patient Currently in Pain: Denies       Orientation  Orientation  Overall Orientation Status: Within Functional Limits  Objective   Bed mobility  Supine to Sit: Unable to assess  Comment: pt seated in chair upon arrival and returned to chair after amb  Transfers  Sit to Stand: Contact guard assistance  Stand to sit: Contact guard assistance  Comment: pt requires mod vc's for hand placement risk for falls, Gait belt, Nurse notified, All fall risk precautions in place, Chair alarm in place, Telesitter in use  Restraints  Initially in place: No     Therapy Time   Individual Concurrent Group Co-treatment   Time In 0944         Time Out 1011         Minutes [address] [place], [place]

## 2017-12-17 NOTE — PROGRESS NOTES
Progress Note    Patient Name:  Carlton Malave    :  2017 3:14 PM      SUBJECTIVE       Ms. Lorenza Osgood    Is moderately confused this afternoon. States she is in [place]. And that the year is 1720. OBJECTIVE     Vital signs:    /60   Pulse 81   Temp 97.9 °F (36.6 °C) (Oral)   Resp 16   Ht 5' (1.524 m)   Wt 189 lb 9.5 oz (86 kg)   SpO2 91%   BMI 37.03 kg/m²  2 L/min      Admit Weight:  182 lb 15.7 oz (83 kg)    Last 3 weights: Wt Readings from Last 3 Encounters:   17 189 lb 9.5 oz (86 kg)       BMI: Body mass index is 37.03 kg/m². Input/Output:       Intake/Output Summary (Last 24 hours) at 17 1514  Last data filed at 17 0841   Gross per 24 hour   Intake              660 ml   Output              400 ml   Net              260 ml         Exam:     General appearance: awake moves all ext   Lungs: no rhonchi, no wheezes, no rales  Heart: S1 and S2 no murmur  Abdomen: positive bowel sounds, no bruits, no masses  Extremities: warm and dry, no cyanosis, no clubbing moderate ecchymosis and weeping from left lower extremity saphenous vein graft site harvesting. Laboratory Studies:     CBC: Recent Labs      12/15/17   0509  17   0435  17   0415   WBC  10.3  10.6  11.5*   HGB  9.3*  8.8*  10.0*   HCT  29.1*  27.8*  30.8*   MCV  95.1  97.2  93.6   PLT  98*  117*  See Reflexed IPF Result     BMP: Recent Labs      12/15/17   0509  17   0435   NA  136  138   K  5.2  5.4*   CL  101  102   CO2  29  24   BUN  12  16   CREATININE  0.83  0.76     PT/INR: No results for input(s): PROTIME, INR in the last 72 hours. APTT: No results for input(s): APTT in the last 72 hours. MAG:   Recent Labs      12/15/17   0509  17   0435  17   0415   MG  2.4  2.3  2.2     D Dimer: No results for input(s): DDIMER in the last 72 hours. Troponin  No results for input(s): TROPONINI in the last 72 hours.       No results for input(s): TROPONINT in the last 72

## 2017-12-17 NOTE — PROGRESS NOTES
The patient ate her am meal without difficulty. She took her oral am medications with water. She vomited all that she took in the morning without warning of nausea. She reports she does not feel nauseous and feels well. Will continue to monitor.

## 2017-12-17 NOTE — PROGRESS NOTES
[facility]    Progress Note    12/17/2017    1:32 PM    Name:   Sushant Calvin  MRN:     [id number]     Acct:      [de-identified]   Room:   [address] [place] [address] Day:  6  Admit Date:  12/11/2017  1:30 AM    PCP:   Johnny Watson DO  Code Status:  Full Code    Subjective:     C/C: Chest pain. Rn reports 1 episode of emesis this morning. Up to chair this morning. Has central line Left SC . Patient denies any complaints. Brief History:   82 y.o.  Non-/non  female with no significant PMH presents with chest pain that started few hours PTA after a trip to grocery store. Substernal with radiation over the entire chest 6-8/10 severe, 'pressure like' . Lasted for 4-5 hrs after which she decided to drive herself to [facility] .   CT chest showed no PE, +b/l mediastinal lymphadenopathy. Since Troponin was trending up she was transferred here this morning. Trop max >40. Cath done EF 25% with multivessel CAD and  s/p CABGx4 -12/13 and  found to have EF 25-30% . Post op complicated by intermittent confusion(?sundowning/dementia) and A. Flutter. Urine cx sent from barr placed preop showed Gr D enterococcus. Started on empiric abx. Review of Systems:     Constitutional:  negative for chills, fevers, sweats  Respiratory:  negative for cough, d shortness of breath, wheezing  Cardiovascular:  reports pain over surgical site but otherwise well. Gastrointestinal:  negative for abdominal pain, constipation, diarrhea, nausea, vomiting  Neurological:  negative for dizziness, headache.      Medications:      Allergies:  No Known Allergies    Current Meds:   Scheduled Meds:    diphenhydrAMINE        potassium chloride  20 mEq Oral Daily    digoxin  125 mcg Oral Daily    docusate sodium  100 mg Oral BID    polyethylene glycol  17 g Oral Daily    nitrofurantoin (macrocrystal-monohydrate)  100 mg Oral 2 times per day    carvedilol 3.125 mg Oral BID WC    insulin lispro  0-3 Units Subcutaneous Nightly    insulin lispro  0-6 Units Subcutaneous TID WC    famotidine  10 mg Oral BID    amiodarone  200 mg Oral TID    therapeutic multivitamin-minerals  1 tablet Oral Daily with breakfast    simvastatin  20 mg Oral Nightly    aspirin  81 mg Oral Daily    aspirin  150 mg Rectal Once    clopidogrel  75 mg Oral Daily    sodium chloride  250 mL Intravenous Once     Continuous Infusions:    dextrose       PRN Meds: bisacodyl, sodium chloride flush, calcium chloride IVPB, magnesium sulfate, potassium chloride, acetaminophen, fentanNYL, diphenhydrAMINE, magnesium hydroxide, ondansetron, metoclopramide, hydrALAZINE, glucose, dextrose, glucagon (rDNA), dextrose, traMADol    Data:     Past Medical History:   has a past medical history of NSTEMI (non-ST elevated myocardial infarction) ([facility] [address] Social History:   reports that she has never smoked. She does not have any smokeless tobacco history on file. Family History: History reviewed. No pertinent family history. Vitals:  /60   Pulse 81   Temp 97.9 °F (36.6 °C) (Oral)   Resp 16   Ht 5' (1.524 m)   Wt 189 lb 9.5 oz (86 kg)   SpO2 91%   BMI 37.03 kg/m²   Temp (24hrs), Av.5 °F (36.4 °C), Min:97.3 °F (36.3 °C), Max:97.9 °F (36.6 °C)    Recent Labs      17   1705  17   2107  17   0648  17   1129   POCGLU  110*  111*  99  122*       I/O (24Hr): Intake/Output Summary (Last 24 hours) at 17 1332  Last data filed at 17 0841   Gross per 24 hour   Intake             1160 ml   Output              400 ml   Net              760 ml       Physical Examination:        General appearance:  alert, up in chair. No distress. Mental Status: oriented to person,  time , not to place. Following commands appropriately   Lungs: b/l air entry+ minimal basal wheeze. Heart:  Abdomen:  soft, nontender, normal bowel sounds.   Extremities:  multiple bruises noted over

## 2017-12-17 NOTE — PLAN OF CARE
[facility]    Second Visit Note  For more detailed information please refer to the progress note of the day      12/17/2017    6:26 PM    Name:   Tasha Shrestha  MRN:     [id number]     Dellalyside:      [de-identified]   Room:   [facility]1009-01   Day:  6  Admit Date:  12/11/2017  1:30 AM    PCP:   Dellie Cushing, DO  Code Status:  Full Code    Pt vitals were reviewed   New labs were reviewed   Patient was seen. Updated plan : Family had questions about need for Neurology evaluation. All questions answered. Confusion likely sundowning/dementia. A/w Neurology recs. Anticoagulation on hold sec to LE hematoma.        Zoila Holland MD  12/17/2017  6:26 PM

## 2017-12-18 PROBLEM — E87.1 HYPONATREMIA: Status: ACTIVE | Noted: 2017-12-18

## 2017-12-18 PROBLEM — I95.81 POSTPROCEDURAL HYPOTENSION: Status: ACTIVE | Noted: 2017-12-18

## 2017-12-18 PROBLEM — I21.4 NSTEMI (NON-ST ELEVATED MYOCARDIAL INFARCTION) (HCC): Status: ACTIVE | Noted: 2017-12-11

## 2017-12-18 PROBLEM — R79.89 ELEVATED TSH: Status: ACTIVE | Noted: 2017-12-18

## 2017-12-18 LAB
DIGOXIN DATE LAST DOSE: NORMAL
DIGOXIN DOSE AMOUNT: NORMAL
DIGOXIN DOSE TIME: NORMAL
DIGOXIN LEVEL: 1.8 NG/ML (ref 0.5–2)
GLUCOSE BLD-MCNC: 100 MG/DL (ref 65–105)
GLUCOSE BLD-MCNC: 109 MG/DL (ref 65–105)
GLUCOSE BLD-MCNC: 141 MG/DL (ref 65–105)
GLUCOSE BLD-MCNC: 150 MG/DL (ref 65–105)
MAGNESIUM: 2.4 MG/DL (ref 1.6–2.6)

## 2017-12-18 PROCEDURE — 80162 ASSAY OF DIGOXIN TOTAL: CPT

## 2017-12-18 PROCEDURE — 99232 SBSQ HOSP IP/OBS MODERATE 35: CPT | Performed by: INTERNAL MEDICINE

## 2017-12-18 PROCEDURE — 94762 N-INVAS EAR/PLS OXIMTRY CONT: CPT

## 2017-12-18 PROCEDURE — 97535 SELF CARE MNGMENT TRAINING: CPT

## 2017-12-18 PROCEDURE — 97608 NEG PRS WND THER NDME>50SQCM: CPT

## 2017-12-18 PROCEDURE — 99232 SBSQ HOSP IP/OBS MODERATE 35: CPT | Performed by: NURSE PRACTITIONER

## 2017-12-18 PROCEDURE — 6370000000 HC RX 637 (ALT 250 FOR IP): Performed by: THORACIC SURGERY (CARDIOTHORACIC VASCULAR SURGERY)

## 2017-12-18 PROCEDURE — 76937 US GUIDE VASCULAR ACCESS: CPT

## 2017-12-18 PROCEDURE — 83735 ASSAY OF MAGNESIUM: CPT

## 2017-12-18 PROCEDURE — 2140000001 HC CVICU INTERMEDIATE R&B

## 2017-12-18 PROCEDURE — 36415 COLL VENOUS BLD VENIPUNCTURE: CPT

## 2017-12-18 PROCEDURE — 99024 POSTOP FOLLOW-UP VISIT: CPT | Performed by: NURSE PRACTITIONER

## 2017-12-18 PROCEDURE — 97530 THERAPEUTIC ACTIVITIES: CPT

## 2017-12-18 PROCEDURE — 6370000000 HC RX 637 (ALT 250 FOR IP): Performed by: NURSE PRACTITIONER

## 2017-12-18 PROCEDURE — 82947 ASSAY GLUCOSE BLOOD QUANT: CPT

## 2017-12-18 RX ORDER — QUETIAPINE FUMARATE 25 MG/1
50 TABLET, FILM COATED ORAL 2 TIMES DAILY
Status: DISCONTINUED | OUTPATIENT
Start: 2017-12-18 | End: 2017-12-18

## 2017-12-18 RX ORDER — QUETIAPINE FUMARATE 25 MG/1
25 TABLET, FILM COATED ORAL 2 TIMES DAILY
Status: DISCONTINUED | OUTPATIENT
Start: 2017-12-18 | End: 2017-12-19

## 2017-12-18 RX ORDER — SODIUM CHLORIDE 9 MG/ML
INJECTION, SOLUTION INTRAVENOUS CONTINUOUS
Status: DISCONTINUED | OUTPATIENT
Start: 2017-12-18 | End: 2017-12-20 | Stop reason: HOSPADM

## 2017-12-18 RX ORDER — CIPROFLOXACIN 500 MG/1
500 TABLET, FILM COATED ORAL EVERY 12 HOURS SCHEDULED
Status: DISCONTINUED | OUTPATIENT
Start: 2017-12-18 | End: 2017-12-20

## 2017-12-18 RX ADMIN — CIPROFLOXACIN 500 MG: 500 TABLET, FILM COATED ORAL at 08:20

## 2017-12-18 RX ADMIN — QUETIAPINE FUMARATE 25 MG: 25 TABLET ORAL at 20:03

## 2017-12-18 RX ADMIN — ASPIRIN 81 MG: 81 TABLET, COATED ORAL at 08:20

## 2017-12-18 RX ADMIN — SIMVASTATIN 20 MG: 20 TABLET, FILM COATED ORAL at 20:02

## 2017-12-18 RX ADMIN — CLOPIDOGREL 75 MG: 75 TABLET, FILM COATED ORAL at 08:20

## 2017-12-18 RX ADMIN — POLYETHYLENE GLYCOL 3350 17 G: 17 POWDER, FOR SOLUTION ORAL at 08:23

## 2017-12-18 RX ADMIN — APIXABAN 2.5 MG: 2.5 TABLET, FILM COATED ORAL at 08:20

## 2017-12-18 RX ADMIN — DOCUSATE SODIUM 100 MG: 100 CAPSULE ORAL at 20:03

## 2017-12-18 RX ADMIN — ACETAMINOPHEN 650 MG: 325 TABLET ORAL at 20:13

## 2017-12-18 RX ADMIN — AMIODARONE HYDROCHLORIDE 200 MG: 200 TABLET ORAL at 20:03

## 2017-12-18 RX ADMIN — LEVOTHYROXINE SODIUM 50 MCG: 50 TABLET ORAL at 08:20

## 2017-12-18 RX ADMIN — DIGOXIN 125 MCG: 0.12 TABLET ORAL at 08:20

## 2017-12-18 RX ADMIN — APIXABAN 2.5 MG: 2.5 TABLET, FILM COATED ORAL at 20:30

## 2017-12-18 RX ADMIN — AMIODARONE HYDROCHLORIDE 200 MG: 200 TABLET ORAL at 08:20

## 2017-12-18 RX ADMIN — CARVEDILOL 3.12 MG: 3.12 TABLET, FILM COATED ORAL at 08:20

## 2017-12-18 RX ADMIN — MULTIPLE VITAMINS W/ MINERALS TAB 1 TABLET: TAB at 08:20

## 2017-12-18 RX ADMIN — CIPROFLOXACIN 500 MG: 500 TABLET, FILM COATED ORAL at 20:30

## 2017-12-18 RX ADMIN — FAMOTIDINE 10 MG: 20 TABLET, FILM COATED ORAL at 20:02

## 2017-12-18 RX ADMIN — ACETAMINOPHEN 650 MG: 325 TABLET ORAL at 08:23

## 2017-12-18 RX ADMIN — DOCUSATE SODIUM 100 MG: 100 CAPSULE ORAL at 08:20

## 2017-12-18 RX ADMIN — QUETIAPINE FUMARATE 50 MG: 25 TABLET ORAL at 08:21

## 2017-12-18 RX ADMIN — FAMOTIDINE 10 MG: 20 TABLET, FILM COATED ORAL at 08:20

## 2017-12-18 ASSESSMENT — PAIN SCALES - GENERAL
PAINLEVEL_OUTOF10: 5
PAINLEVEL_OUTOF10: 0
PAINLEVEL_OUTOF10: 6
PAINLEVEL_OUTOF10: 0
PAINLEVEL_OUTOF10: 0
PAINLEVEL_OUTOF10: 3

## 2017-12-18 ASSESSMENT — PAIN DESCRIPTION - ONSET: ONSET: ON-GOING

## 2017-12-18 ASSESSMENT — PAIN DESCRIPTION - LOCATION
LOCATION: BACK;CHEST
LOCATION: KNEE

## 2017-12-18 ASSESSMENT — PAIN DESCRIPTION - FREQUENCY: FREQUENCY: CONTINUOUS

## 2017-12-18 ASSESSMENT — PAIN DESCRIPTION - PAIN TYPE
TYPE: ACUTE PAIN
TYPE: SURGICAL PAIN

## 2017-12-18 ASSESSMENT — PAIN DESCRIPTION - ORIENTATION: ORIENTATION: RIGHT;LEFT

## 2017-12-18 ASSESSMENT — PAIN DESCRIPTION - DESCRIPTORS: DESCRIPTORS: DISCOMFORT

## 2017-12-18 ASSESSMENT — PAIN DESCRIPTION - PROGRESSION: CLINICAL_PROGRESSION: NOT CHANGED

## 2017-12-18 NOTE — PROGRESS NOTES
Av.5 %  Min: 94 %  Max: 97 %  Supplemental O2: O2 Flow Rate (L/min): 2 L/min     Current Meds:    QUEtiapine  50 mg Oral BID    ciprofloxacin  500 mg Oral 2 times per day    apixaban  2.5 mg Oral BID    levothyroxine  50 mcg Oral Daily    digoxin  125 mcg Oral Daily    docusate sodium  100 mg Oral BID    polyethylene glycol  17 g Oral Daily    carvedilol  3.125 mg Oral BID WC    insulin lispro  0-3 Units Subcutaneous Nightly    insulin lispro  0-6 Units Subcutaneous TID WC    famotidine  10 mg Oral BID    amiodarone  200 mg Oral TID    therapeutic multivitamin-minerals  1 tablet Oral Daily with breakfast    simvastatin  20 mg Oral Nightly    aspirin  81 mg Oral Daily    aspirin  150 mg Rectal Once    clopidogrel  75 mg Oral Daily     Continuous Infusions:    dextrose              ASSESSMENT     Principal Problem:    CAD, multiple vessel  Active Problems:    MI, acute, non ST segment elevation (HCC)    Pure hypercholesterolemia    Confusion    Anemia, blood loss    Enterococcus UTI    Atrial flutter (HCC)    Encephalopathy      PLAN     Continue post care      Electronically signed by Raegan Parks MD on 2017 at 9:00 AM

## 2017-12-18 NOTE — PROGRESS NOTES
Occupational Therapy  Facility/Department: [facility] CAR 1  Daily Treatment Note  NAME: Stephon Curry  : 1935  MRN: [id number]    Date of Service: 2017    Patient Diagnosis(es): There were no encounter diagnoses. has a past medical history of NSTEMI (non-ST elevated myocardial infarction) ([facility] [address] has a past surgical history that includes joint replacement; Cardiac catheterization (2017); Cataract removal (Bilateral); and Coronary artery bypass graft (N/A, 2017). Restrictions  Restrictions/Precautions  Restrictions/Precautions: General Precautions, Fall Risk, Surgical Protocols  Required Braces or Orthoses?: No  Position Activity Restriction  Sternal Precautions: No Pushing, No Pulling, 5# Lifting Restrictions  Other position/activity restrictions: CABG x4 /sternal prec  Subjective   General  Patient assessed for rehabilitation services?: Yes  Family / Caregiver Present: No (pt now has telesitter/sitter for safety reasons)  Pain Assessment  Patient Currently in Pain: Yes  Pain Assessment: 0-10  Pain Level: 5  Pain Type: Surgical pain  Pain Location: Back; Chest  Pain Intervention(s): Medication (see eMar);Repositioned; Ambulation/Increased activity; Emotional support  Multiple Pain Sites: No  Vital Signs  Patient Currently in Pain: Yes   Orientation  Orientation  Overall Orientation Status: impaired- oriented to self, pt aware of being in hospital, disoriented to year, month, situation. Objective    ADL  Feeding: Setup;Verbal cueing; Increased time to complete;Stand by assistance (seated in recliner)  Grooming: Setup;Verbal cueing; Increased time to complete;Stand by assistance (seated in recliner)  UE Dressing: Setup;Maximum assistance (donned heart hugger bra per RN request)  LE Dressing: Setup;Maximum assistance  Toileting: Setup;Minimal assistance;Contact guard assistance  Additional Comments: pt in bed upon arrival sleeping. Pt agreeable to get up and participate in therapy.    Bed mob: sup<sit- mod/max A : scooting to the EOB- mod A- increased time to complete bed mob d/t pt drowsy from just waking up. Balance  Sitting Balance: Contact guard assistance (seated on EOB, independent in recliner)  Standing Balance: Contact guard assistance  Standing Balance  Sit to stand: Minimal assistance  Stand to sit: Contact guard assistance   Transfers  Stand Step Transfers: Contact guard assistance (recliner<chair<toilet- left pt on toilet w/ sitter and notified RN pt's Leg was bleeding and needing dressing change- RN went into bathroom as writer exited)  Sit to stand: Minimal assistance  Stand to sit: Contact guard assistance  Attendance  Participation: Active participation         Assessment   Performance deficits / Impairments: Decreased functional mobility ; Decreased endurance;Decreased ADL status; Decreased safe awareness;Decreased high-level IADLs  Prognosis: Good  Patient Education: Ed on OT services/POC, bed mob, transfer safety, orientation review- fair return  REQUIRES OT FOLLOW UP: Yes  Activity Tolerance  Activity Tolerance: Patient Tolerated treatment well;Treatment limited secondary to decreased cognition (pt confused on this date noted compared to last session)  Safety Devices  Safety Devices in place: Yes  Type of devices:  All fall risk precautions in place;Nurse notified (pt on toilet and sitter in bathroom providing care)         Plan   Plan  Times per week: 4-5x/wk   Current Treatment Recommendations: Functional Mobility Training, Endurance Training, Safety Education & Training, Self-Care / ADL, Patient/Caregiver Education & Training, Equipment Evaluation, Education, & procurement    Goals  Short term goals  Time Frame for Short term goals: by discharge, pt will  Short term goal 1: demo I in UE ADL activites   Short term goal 2: demo MI/ I in LE ADL activites with AD as needed  Short term goal 3: demo understanding and I use of EC/WS, sternal precautions, proper pursed lipped breathing and

## 2017-12-18 NOTE — PLAN OF CARE
[address] [place] [address]       Second Visit Note  For more detailed information please refer to the progress note of the day      12/18/2017    6:51 PM    Name:   Jacinto Wahl  MRN:     [id number]     Arielleide:      [de-identified]   Room:   [address] [place] [address] Day:  7  Admit Date:  12/11/2017  1:30 AM    PCP:   Eva Mendes DO  Code Status:  Full Code    Patient was seen  Up to chair  Eating dinner  Looks better  Less confused    CURRENT MEDS:     ciprofloxacin (CIPRO) tablet 500 mg 2 times per day   apixaban (ELIQUIS) tablet 2.5 mg BID   0.9 % sodium chloride infusion Continuous   QUEtiapine (SEROQUEL) tablet 25 mg BID   levothyroxine (SYNTHROID) tablet 50 mcg Daily   digoxin (LANOXIN) tablet 125 mcg Daily   docusate sodium (COLACE) capsule 100 mg BID   polyethylene glycol (GLYCOLAX) packet 17 g Daily   bisacodyl (DULCOLAX) suppository 10 mg Daily PRN   insulin lispro (HUMALOG) injection vial 0-3 Units Nightly   insulin lispro (HUMALOG) injection vial 0-6 Units TID WC   famotidine (PEPCID) tablet 10 mg BID   sodium chloride flush 0.9 % injection 10 mL PRN   calcium chloride 1 g in sodium chloride 0.9 % 100 mL IVPB PRN   magnesium sulfate 1 g in dextrose 5% 100 mL IVPB PRN   potassium chloride 20 mEq/50 mL IVPB (Central Line) PRN   acetaminophen (TYLENOL) tablet 650 mg Q4H PRN   fentaNYL (SUBLIMAZE) injection 50 mcg Q1H PRN   diphenhydrAMINE (BENADRYL) tablet 25 mg Nightly PRN   magnesium hydroxide (MILK OF MAGNESIA) 400 MG/5ML suspension 30 mL Daily PRN   ondansetron (ZOFRAN) injection 4 mg Q8H PRN   metoclopramide (REGLAN) injection 10 mg Q6H PRN   amiodarone (CORDARONE) tablet 200 mg TID   hydrALAZINE (APRESOLINE) injection 5 mg Q5 Min PRN   therapeutic multivitamin-minerals 1 tablet Daily with breakfast   simvastatin (ZOCOR) tablet 20 mg Nightly   aspirin EC tablet 81 mg Daily   aspirin suppository 150 mg Once   clopidogrel (PLAVIX) tablet 75 mg Daily   glucose (GLUTOSE) 40 % oral gel 15 g PRN   dextrose 50 % solution 12.5 g PRN   glucagon (rDNA) injection 1 mg PRN   dextrose 5 % solution PRN   traMADol (ULTRAM) tablet 50 mg Q6H PRN       VITALS:  BP (!) 112/48   Pulse 68   Temp 97.3 °F (36.3 °C) (Oral)   Resp 25   Ht 5' (1.524 m)   Wt 190 lb 11.2 oz (86.5 kg)   SpO2 100%   BMI 37.24 kg/m²     LABS:   Hematology:  Recent Labs      12/16/17 0435 12/17/17 0415   WBC  10.6  11.5*   HGB  8.8*  10.0*   HCT  27.8*  30.8*   PLT  117*  See Reflexed IPF Result     Chemistry:  Recent Labs      12/16/17 0435  12/17/17 0415  12/17/17   1455  12/18/17 0435   NA  138   --   134*   --    K  5.4*   --   5.2   --    CL  102   --   97*   --    CO2  24   --   26   --    GLUCOSE  120*   --   115*   --    BUN  16   --   18   --    CREATININE  0.76   --   0.74   --    MG  2.3  2.2   --   2.4   CALCIUM  8.5*   --   8.9   --      Recent Labs      12/17/17   1455   TSH  10.67*   AMMONIA  14       PROBLEMS:  Principal Problem:    CAD, multiple vessel  Active Problems:    MI, acute, non ST segment elevation (HCC)    Pure hypercholesterolemia    Confusion    Anemia, blood loss    Enterococcus UTI    Atrial flutter (HCC)    Encephalopathy    Hyponatremia    NSTEMI (non-ST elevated myocardial infarction) (HCC)    Elevated TSH    Postprocedural hypotension      UPDATED PLAN:  See current orders  Optimize cardiopulmonary function  Neurology evaluation  Antibiotics per C&S   Correct electrolyte abnormalities  May need thyroid replacement  Monitor and control blood pressure  The patient's status and plan have been discussed with the patient and family at the bedside     IP CONSULT TO CARDIOLOGY  IP CONSULT TO CARDIAC REHAB  IP CONSULT  [facility]  IP CONSULT TO Ernesto Steinberg DO  12/18/2017  6:51 PM

## 2017-12-18 NOTE — PROGRESS NOTES
Daily       Past Medical History:   Diagnosis Date    NSTEMI (non-ST elevated myocardial infarction) ([facility] [address] 12/11/2017       Past Surgical History:   Procedure Laterality Date    CARDIAC CATHETERIZATION  12/12/2017    DR Gill Villalta   [facility] CATARACT REMOVAL Bilateral     CORONARY ARTERY BYPASS GRAFT N/A 12/13/2017    CABG CORONARY ARTERY BYPASS x 4 OFF PUMP, SWAN, MAL performed by Samantha Kebede MD at [facility]      total right knee       PHYSICAL EXAM:      Blood pressure (!) 92/58, pulse 67, temperature 97.7 °F (36.5 °C), temperature source Oral, resp. rate 16, height 5' (1.524 m), weight 190 lb 11.2 oz (86.5 kg), SpO2 100 %.       Neurological Examination:  Mental status   Pt was very drowsy but opened eyes on verbal stimuli; took some time to respond, knew her full name; knew she was in a hospital; followed simple commands; didn't follow cross body commands; didn't know R from L; otherwise normal speech    Cranial nerves   II - visual fields intact to confrontation                                        III, IV, VI  no ptosis                                                   VIII - intact hearing                                                                       Motor function  Squeezed both hands & wiggled toes on command   Sensory function Withdrawal on painful stimuli   Cerebellar No visible involuntary movements or tremors   Reflex function Intact 2+ DTR and symmetric with no pathologic reflex or Babinski sign   Gait                  Not tested       DATA      Lab Results   Component Value Date    WBC 11.5 (H) 12/17/2017    HGB 10.0 (L) 12/17/2017    HCT 30.8 (L) 12/17/2017    PLT See Reflexed IPF Result 12/17/2017    CHOL 196 12/12/2017    TRIG 63 12/12/2017    HDL 80 12/12/2017    ALT 22 12/12/2017    AST 77 (H) 12/12/2017     (L) 12/17/2017    K 5.2 12/17/2017    CL 97 (L) 12/17/2017    CREATININE 0.74 12/17/2017    BUN 18 12/17/2017    CO2 26 12/17/2017    TSH 10.67 (H)

## 2017-12-18 NOTE — PLAN OF CARE
Problem: SAFETY  Goal: Free from accidental physical injury  Outcome: Ongoing      Problem: Falls - Risk of  Goal: Absence of falls  Outcome: Ongoing      Problem: Risk for Impaired Skin Integrity  Goal: Tissue integrity - skin and mucous membranes  Structural intactness and normal physiological function of skin and  mucous membranes.    Outcome: Ongoing

## 2017-12-18 NOTE — PLAN OF CARE
Problem: Pain:  Goal: Pain level will decrease  Pain level will decrease   Outcome: Ongoing    Goal: Control of acute pain  Control of acute pain   Outcome: Ongoing      Problem: SAFETY  Goal: Free from accidental physical injury  Outcome: Ongoing    Goal: Free from intentional harm  Outcome: Ongoing      Problem: DAILY CARE  Goal: Daily care needs are met  Outcome: Ongoing      Problem: PAIN  Goal: Patient's pain/discomfort is manageable  Outcome: Ongoing      Problem: SKIN INTEGRITY  Goal: Skin integrity is maintained or improved  Outcome: Ongoing      Problem: KNOWLEDGE DEFICIT  Goal: Patient/S.O. demonstrates understanding of disease process, treatment plan, medications, and discharge instructions.   Outcome: Ongoing      Problem: DISCHARGE BARRIERS  Goal: Patient's continuum of care needs are met  Outcome: Ongoing      Problem: OXYGENATION/RESPIRATORY FUNCTION  Goal: Patient will maintain patent airway  Outcome: Ongoing    Goal: Patient will achieve/maintain normal respiratory rate/effort  Respiratory rate and effort will be within normal limits for the patient   Outcome: Ongoing      Problem: Musculor/Skeletal Functional Status  Goal: Highest potential functional level  Outcome: Ongoing      Problem: Musculor/Skeletal Functional Status  Goal: Highest potential functional level  Outcome: Ongoing    Goal: Absence of falls  Outcome: Ongoing

## 2017-12-18 NOTE — PROGRESS NOTES
Nutrition Assessment    Type and Reason for Visit: Reassess    Nutrition Recommendations: Recommend liberalize diet to No Added Salt and continue Ensure Plus supplements. Malnutrition Assessment:  · Malnutrition Status: Insufficient data    Nutrition Diagnosis:   · Problem: Inadequate oral intake  · Etiology: related to Cognitive or neurological impairment     Signs and symptoms:  as evidenced by Intake 0-25%    Nutrition Assessment:  · Subjective Assessment: Pt consuming only bites and sips. Education is not appropriate for this pt. · Wound Type: Surgical Wound  · Current Nutrition Therapies:  · Oral Diet Orders: Cardiac   · Oral Diet intake: 1-25%  · Oral Nutrition Supplement (ONS) Orders: Standard High Calorie Oral Supplement  · Anthropometric Measures:  · Ht: 5' (152.4 cm)   · Current Body Wt: 189 lb 9.5 oz (86 kg)  · Ideal Body Wt: 100 lb (45.4 kg), % Ideal Body 189%  · Adjusted Body Wt: 128 lb (58.1 kg), body weight adjusted for    · BMI Classification: BMI 35.0 - 39.9 Obese Class II  · Comparative Standards (Estimated Nutrition Needs):  · Estimated Daily Total Kcal: 9883-9803 kcal per day   · Estimated Daily Protein (g): 70-80 g pro per day     Estimated Intake vs Estimated Needs: Intake Less Than Needs    Nutrition Risk Level: Moderate    Nutrition Interventions:   Continue current diet, Continue current ONS  Education not appropriate at this time    Nutrition Evaluation:   · Evaluation: Progressing toward goals   · Goals: meet % of estimated nutrition needs with oral diet /supplements     · Monitoring: Meal Intake, Supplement Intake, Wound Healing    See Adult Nutrition Doc Flowsheet for more detail.      Electronically signed by Nancy Brothers RD, LD on 12/18/17 at 1:14 PM    Contact Number: [phone]

## 2017-12-18 NOTE — PROGRESS NOTES
Patient drowsy, Dr. Katerin Hernandez notified. See order for fluids. Let patient rest, continue to monitor.

## 2017-12-18 NOTE — PROGRESS NOTES
Physical Therapy  DATE: 2017    NAME: Tiffany Alanis  MRN: [id number]   : 1935    Patient not seen this date for Physical Therapy due to:  [] Blood transfusion in progress  [] Hemodialysis  []  Patient Declined  [] Spine Precautions   [] Strict Bedrest  [] Surgery/ Procedure  [] Testing      [x] Other  -  Per RN pt isn't appropriate for a treatment this AM. Will check on pt in the PM.       [] PT being discontinued at this time. Patient independent. No further needs. [] PT being discontinued at this time as the patient has been transferred to palliative care. No further needs.     Billie Leyva, PTA

## 2017-12-18 NOTE — PROGRESS NOTES
12/18/17 0820    apixaban (ELIQUIS) tablet 2.5 mg, 2.5 mg, Oral, BID, Lakia Will MD, 2.5 mg at 12/18/17 0820    0.9 % sodium chloride infusion, , Intravenous, Continuous, Lakia Will MD, Last Rate: 50 mL/hr at 12/18/17 0940    QUEtiapine (SEROQUEL) tablet 25 mg, 25 mg, Oral, BID, Lakia Will MD    levothyroxine (SYNTHROID) tablet 50 mcg, 50 mcg, Oral, Daily, Lakia Will MD, 50 mcg at 12/18/17 0820    digoxin (LANOXIN) tablet 125 mcg, 125 mcg, Oral, Daily, Lakia Will MD, 125 mcg at 12/18/17 0820    docusate sodium (COLACE) capsule 100 mg, 100 mg, Oral, BID, Yolanda Aldrich CNP, 100 mg at 12/18/17 0820    polyethylene glycol (GLYCOLAX) packet 17 g, 17 g, Oral, Daily, Yolanda Aldrich CNP, 17 g at 12/18/17 0816    bisacodyl (DULCOLAX) suppository 10 mg, 10 mg, Rectal, Daily PRN, Yolanda Aldrich CNP    insulin lispro (HUMALOG) injection vial 0-3 Units, 0-3 Units, Subcutaneous, Nightly, Lakia Will MD, 1 Units at 12/15/17 2200    insulin lispro (HUMALOG) injection vial 0-6 Units, 0-6 Units, Subcutaneous, TID WC, Lakia Will MD, 1 Units at 12/16/17 1403    famotidine (PEPCID) tablet 10 mg, 10 mg, Oral, BID, Lakia Will MD, 10 mg at 12/18/17 0820    sodium chloride flush 0.9 % injection 10 mL, 10 mL, Intravenous, PRN, Lakia Will MD, 10 mL at 12/17/17 0829    calcium chloride 1 g in sodium chloride 0.9 % 100 mL IVPB, 1 g, Intravenous, PRN, Lakia Will MD, Stopped at 12/14/17 2206    magnesium sulfate 1 g in dextrose 5% 100 mL IVPB, 1 g, Intravenous, PRN, Lakia Will MD, Stopped at 12/14/17 2334    potassium chloride 20 mEq/50 mL IVPB (Central Line), 20 mEq, Intravenous, PRN, Lakia Will MD, Last Rate: 50 mL/hr at 12/13/17 1830, 20 mEq at 12/13/17 1830    acetaminophen (TYLENOL) tablet 650 mg, 650 mg, Oral, Q4H PRN, Lakia Will MD, 650 mg at 12/18/17 0823    fentaNYL (SUBLIMAZE) injection 50 mcg, 50 mcg, Intravenous, Q1H PRN, Lakia Will MD, CO2  24   --   26   --    BUN  16   --   18   --    CREATININE  0.76   --   0.74   --    MG  2.3  2.2   --   2.4     Accucheck Glucoses:   Recent Labs      12/17/17   0648  12/17/17   1129  12/17/17   1617  12/17/17   2055  12/18/17   0709   POCGLU  99  122*  99  122*  100     Cardiac Enzymes: No results for input(s): CKTOTAL, CKMB, CKMBINDEX, TROPONINI in the last 72 hours. PTT/PT/INR:   No results for input(s): PROTIME, INR in the last 72 hours. No results for input(s): APTT in the last 72 hours. Assessment & Plan:   1. S/p CABG x 4   POD 5   D/C central line, place peripheral  2. Atrial flutter rate controlled. Will start on anticoagulation (Eliquis), hematoma on leg stable, will place pravena over incision. 3. Confusion, probably sundowning   Seroquel started, as recommended by neuro  4. Enterococcus in Urine   Change antibiotic to cipro today  5. TSH high (10.67), Thyroxine free 1.16 - hypothyroid   Will start levothyroxine    DVT ppx: SCD's while stationary  Patient is on BB,digoxin, ASA, eliquis, Statin therapy per protocol   Plan for discharge home with assist/home care when medically stable    The above recommendations including medications and orders were discussed and agreed upon with  the attending on service for the cardiothoracic surgery group today.      Juan Almonte CNP  Phone: [phone]

## 2017-12-18 NOTE — PROGRESS NOTES
[facility]    Progress Note    12/18/2017    5:06 PM    Name:   Brittni Ernst  MRN:     [id number]     Acct:      [de-identified]   Room:   [address] [place] [address] Day:  7  Admit Date:  12/11/2017  1:30 AM    PCP:   Sonia Salcido DO  Code Status:  Full Code    Subjective:     C/C:  Chest pain  Interval History Status:  stable  Still quite confused  Unable to provide any meaningful data  Somnolent comfortable at this time  Has been sun downing    Brief History:     Patient admitted through the emergency room with chest pain  Workup revealed left ventricular systolic dysfunction - ejection fraction 35%  She was found to have multivessel coronary artery disease  She is now status post coronary bypass grafting ×4 on 12/13  Postop course has included encephalopathy and UTI  BP has been low      Past Medical History:   has a past medical history of NSTEMI (non-ST elevated myocardial infarction) ([facility] [address] Social History:   reports that she has never smoked. She does not have any smokeless tobacco history on file. Family History: History reviewed. No pertinent family history. Medications:      Allergies:  No Known Allergies    Current Meds:   Scheduled Meds:    ciprofloxacin  500 mg Oral 2 times per day    apixaban  2.5 mg Oral BID    QUEtiapine  25 mg Oral BID    levothyroxine  50 mcg Oral Daily    digoxin  125 mcg Oral Daily    docusate sodium  100 mg Oral BID    polyethylene glycol  17 g Oral Daily    insulin lispro  0-3 Units Subcutaneous Nightly    insulin lispro  0-6 Units Subcutaneous TID WC    famotidine  10 mg Oral BID    amiodarone  200 mg Oral TID    therapeutic multivitamin-minerals  1 tablet Oral Daily with breakfast    simvastatin  20 mg Oral Nightly    aspirin  81 mg Oral Daily    aspirin  150 mg Rectal Once    clopidogrel  75 mg Oral Daily     Continuous Infusions:    sodium chloride 50 mL/hr at 12/18/17 0940    dextrose PRN Meds: bisacodyl, sodium chloride flush, calcium chloride IVPB, magnesium sulfate, potassium chloride, acetaminophen, fentanNYL, diphenhydrAMINE, magnesium hydroxide, ondansetron, metoclopramide, hydrALAZINE, glucose, dextrose, glucagon (rDNA), dextrose, traMADol      Review of Systems:     Review of Systems   Unable to perform ROS: Mental status change         Physical Examination:        Vitals:  BP (!) 112/48   Pulse 68   Temp 97.3 °F (36.3 °C) (Oral)   Resp 25   Ht 5' (1.524 m)   Wt 190 lb 11.2 oz (86.5 kg)   SpO2 100%   BMI 37.24 kg/m²   Temp (24hrs), Av.5 °F (36.4 °C), Min:97 °F (36.1 °C), Max:97.9 °F (36.6 °C)    Recent Labs      17   1617  17   2055  17   0709  17   1132   POCGLU  99  122*  100  141*       Physical Exam   Constitutional: No distress. HENT:   Head: Normocephalic and atraumatic. Eyes: Conjunctivae are normal. No scleral icterus. Neck: Neck supple. No JVD present. Cardiovascular: Normal rate and regular rhythm. Pulmonary/Chest: Effort normal and breath sounds normal. No respiratory distress. She has no wheezes. She has no rales. Abdominal: Soft. Bowel sounds are normal. She exhibits no distension. There is no tenderness. There is no rebound. Musculoskeletal: She exhibits no edema or tenderness. Neurological: She exhibits normal muscle tone. Somnolent  Nonconversant  Not following commands   Skin: Skin is warm and dry. She is not diaphoretic. Her sternal and leg wounds are dressed dry and clean         Data:     I/O (24Hr):     Intake/Output Summary (Last 24 hours) at 17 1706  Last data filed at 17 0900   Gross per 24 hour   Intake             1100 ml   Output             1100 ml   Net                0 ml       Labs:    Hematology:  Recent Labs      17   0435  17   0415   WBC  10.6  11.5*   HGB  8.8*  10.0*   HCT  27.8*  30.8*   PLT  117*  See Reflexed IPF Result     Chemistry:  Recent Labs      17   5724 12/17/17   0415  12/17/17   1455  12/18/17   0435   NA  138   --   134*   --    K  5.4*   --   5.2   --    CL  102   --   97*   --    CO2  24   --   26   --    GLUCOSE  120*   --   115*   --    BUN  16   --   18   --    CREATININE  0.76   --   0.74   --    MG  2.3  2.2   --   2.4   CALCIUM  8.5*   --   8.9   --      Recent Labs      12/17/17   1455   TSH  10.67*   AMMONIA  14       Lab Results   Component Value Date/Time    SPECIAL 1ST INSERTION 12/13/2017 12:44 PM     Lab Results   Component Value Date/Time    CULTURE NO GROWTH 12/13/2017 12:44 PM    CULTURE  12/13/2017 12:44 PM     [address] [phone]       Lab Results   Component Value Date    POCPH 7.456 12/14/2017    POCPCO2 35.4 12/14/2017    POCPO2 78.5 12/14/2017    POCHCO3 24.9 12/14/2017    NBEA NOT REPORTED 12/14/2017    PBEA 1 12/14/2017    DWR5LCR 26 12/14/2017    VKLO1EBZ 96 12/14/2017    FIO2 NOT REPORTED 12/14/2017     Urine Culture [[id number]] (Abnormal)  Collected: 12/13/17 0140   Order Status: Completed Specimen: Urine, clean catch Updated: 12/16/17 0732    Specimen Description . CLEAN CATCH URINE    Special Requests NOT REPORTED    Culture GROUP D ENTEROCOCCUS 50 to 100,000 CFU/ML (A)    Culture [address] [phone]    Status FINAL 12/16/2017    Organism EGD     GROUP D ENTEROCOCCUS     Antibiotic Interpretation JOSE MANUEL Status   Gentamicin, High Level  NOT REPORTED Final   Streptomycin, Hi Level  NOT REPORTED Final   Synercid  NOT REPORTED Final   ampicillin Sensitive <=2 SUSCEPTIBLE Final   ciprofloxacin Sensitive 1 SUSCEPTIBLE Final   erythromycin  NOT REPORTED Final   levofloxacin Sensitive 1 SUSCEPTIBLE Final   linezolid  NOT REPORTED Final   nitrofurantoin Sensitive <=16 SUSCEPTIBLE Final   penicillin  NOT REPORTED Final   tetracycline Resistant >=16 RESISTANT Final   tigecycline  NOT REPORTED Final   vancomycin Sensitive           Radiology:  Chest x-ray:  Impression:        No acute findings.  Support devices as described.  Interval extubation.          Assessment:        Primary Problem  Principal Problem:    CAD, multiple vessel  Active Problems:    MI, acute, non ST segment elevation (HCC)    Pure hypercholesterolemia    Confusion    Anemia, blood loss    Enterococcus UTI    Atrial flutter (HCC)    Encephalopathy    Hyponatremia    NSTEMI (non-ST elevated myocardial infarction) ([facility] [address]    Elevated TSH    Postprocedural hypotension      Plan:        Optimize cardiopulmonary function  Neurology evaluation  Antibiotics per C&S   Correct electrolyte abnormalities  May need thyroid replacement  Monitor and control blood pressure    IP CONSULT TO CARDIOLOGY  IP CONSULT TO CARDIAC REHAB  IP CONSULT TO CARDIOTHORACIC SURGERY  IP CONSULT TO CARDIAC REHAB  IP CONSULT TO SPIRITUAL SERVICES  IP CONSULT TO DIETITIAN  IP CONSULT TO DIETITIAN  IP CONSULT TO NEUROLOGY  IP CONSULT TO IV TEAM    Tiffanie Sheffield DO  12/18/2017  5:06 PM

## 2017-12-18 NOTE — OP NOTE
[address] [facility]                                 OPERATIVE REPORT    PATIENT NAME: VERITO ESPINOZA                          :        1935  MED REC NO:   [id number]                             ROOM:       [id number]  ACCOUNT NO:   [de-identified]                           ADMIT DATE: 2017  PROVIDER:     Cachorro Mejia    DATE OF PROCEDURE:  2017    PREOPERATIVE DIAGNOSIS:  Coronary artery disease status post ST elevation  MI.    POSTOPERATIVE DIAGNOSIS:  Coronary artery disease status post ST elevation  MI.    OPERATIONS:  Off-pump quadruple coronary bypass grafting using left GERSON to  LAD, saphenous vein graft to diagonal, saphenous vein graft to ramus  marginalis and obtuse marginal in sequence, endoscopic vein harvest,  intraoperative ultrasonic guidance, transesophageal echocardiography by  Anesthesia. SURGEON:  Cachorro Mejia MD    ASSISTANTS:  CARLOS Crystal; and Davy Daugherty PA-C. ANESTHESIA:  General endotracheal.    FINDINGS:  The ventricular function was quite decreased with ejection  fraction about 25%. There was hypokinesia of the anterior wall and apex  with some edema. This was due to very large LAD which was critically  stenosed proximally. The LAD was hyper-dominant and supplied the apex as  well as the inferior wall. The patient had no other scar. The vein and  GERSON were of good quality. Intraoperative ultrasonic guidance revealed good  flow in all the grafts. PROCEDURE IN DETAIL:  After satisfactory general endotracheal anesthesia,  the patient was positioned. Skin was prepped from chin to ankles and  draped in the usual fashion. Median sternotomy was carried out  simultaneous with harvesting the saphenous vein endoscopically from the  left thigh. The left GERSON was harvested in a skeletonized fashion. Patient  was heparinized and distal end of the GERSON was divided.   The pericardium was  opened, right pleura was opened,and right pleuropericardial reflection was  divided along the diaphragm. The anterior wall of the heart was  stabilized. The LAD was dissected out, and proximal control was obtained. An arteriotomy was made. A 2 mm shunt was placed, and the left GERSON was  prepared and anastomosed end-to-side using 8-0 Prolene continuous suture  technique. The shunt was removed before completion of anastomosis. The  heart was then gently retracted into the right chest.  The diagonal was  dissected out. Proximal control was obtained, and arteriotomy was made. The reverse saphenous vein segment was anastomosed end-to-side using 7-0  Prolene continuous suture technique. This anastomosis was checked with  probe and was satisfactory. The lateral wall of the heart was stabilized,  and obtuse marginal was dissected out. An arteriotomy was made, and a 2 mm  shunt was placed. Reverse saphenous vein segment was anastomosed  end-to-side using 7-0 Prolene continuous suture technique. The shunt was  removed before completion of anastomosis. The ramus marginalis was then  set up, an arteriotomy was made after proximal control, and a side venotomy  was made on the vein graft. A side-to-side anastomosis was created with  8-0 Prolene continuous suture technique. This anastomosis was checked with  probe and was satisfactory. Once this was done, the proximal anastomoses  were done with pursestring device. The 6-0 Prolene continuous suture  technique was used. The grafts were de-aired and opened. Distal  anastomoses were checked and were satisfactory. Heparin was reversed with  protamine infusion. Intraoperative ultrasonic guidance was obtained and  showed excellent flow in the grafts. The chest tubes were placed, one in  each pleural space, the other in the anterior mediastinum, and sternum was  approximated with interrupted #7 stainless steel wires.   Linea alba and  presternal fascia were closed with #1 Vicryl, subcutaneous tissue was  closed with 3-0 Vicryl, and skin was closed with staples, and a Prevena  dressing was placed. The leg wound was closed with 3-0 and 4-0 Vicryl  sutures with supplementation with staples. Appropriate dressings applied. The patient was taken to ICU in satisfactory condition.         Win Lou    D: 12/15/2017 17:49:55       T: 12/15/2017 17:53:56     [email or web address]  Job#: [id number]     Doc#: [id number]    CC:  [address] [place] [address]

## 2017-12-18 NOTE — CARE COORDINATION
Attempt made to discuss discharge planning with patient. Patient  Is confused and guarded with telesitter at this time. Spoke with patient's daughter and Ananya Hull over the phone. BODØ states she feels uncomfortable taking patient home in current condition. BODØ states she had been given a SNF list and plans to discuss with her brothers. BODØ given my contact information to call when decision has been made    200  Spoke with patient and daughter JEERL at bedside.   Await SNF choice

## 2017-12-19 ENCOUNTER — APPOINTMENT (OUTPATIENT)
Dept: GENERAL RADIOLOGY | Age: 82
DRG: 233 | End: 2017-12-19
Attending: INTERNAL MEDICINE
Payer: MEDICARE

## 2017-12-19 LAB
GLUCOSE BLD-MCNC: 108 MG/DL (ref 65–105)
GLUCOSE BLD-MCNC: 121 MG/DL (ref 65–105)
GLUCOSE BLD-MCNC: 136 MG/DL (ref 65–105)
GLUCOSE BLD-MCNC: 137 MG/DL (ref 65–105)
MAGNESIUM: 2.2 MG/DL (ref 1.6–2.6)

## 2017-12-19 PROCEDURE — 83735 ASSAY OF MAGNESIUM: CPT

## 2017-12-19 PROCEDURE — 6370000000 HC RX 637 (ALT 250 FOR IP): Performed by: THORACIC SURGERY (CARDIOTHORACIC VASCULAR SURGERY)

## 2017-12-19 PROCEDURE — 97110 THERAPEUTIC EXERCISES: CPT

## 2017-12-19 PROCEDURE — 36415 COLL VENOUS BLD VENIPUNCTURE: CPT

## 2017-12-19 PROCEDURE — 94762 N-INVAS EAR/PLS OXIMTRY CONT: CPT

## 2017-12-19 PROCEDURE — 2140000001 HC CVICU INTERMEDIATE R&B

## 2017-12-19 PROCEDURE — 99232 SBSQ HOSP IP/OBS MODERATE 35: CPT | Performed by: NURSE PRACTITIONER

## 2017-12-19 PROCEDURE — 97116 GAIT TRAINING THERAPY: CPT

## 2017-12-19 PROCEDURE — 82947 ASSAY GLUCOSE BLOOD QUANT: CPT

## 2017-12-19 PROCEDURE — 99232 SBSQ HOSP IP/OBS MODERATE 35: CPT | Performed by: INTERNAL MEDICINE

## 2017-12-19 PROCEDURE — 99024 POSTOP FOLLOW-UP VISIT: CPT | Performed by: NURSE PRACTITIONER

## 2017-12-19 PROCEDURE — 97535 SELF CARE MNGMENT TRAINING: CPT

## 2017-12-19 PROCEDURE — 71010 XR CHEST PORTABLE: CPT

## 2017-12-19 RX ORDER — HALOPERIDOL 5 MG/ML
2 INJECTION INTRAMUSCULAR EVERY 6 HOURS PRN
Status: DISCONTINUED | OUTPATIENT
Start: 2017-12-19 | End: 2017-12-20 | Stop reason: HOSPADM

## 2017-12-19 RX ORDER — QUETIAPINE FUMARATE 25 MG/1
25 TABLET, FILM COATED ORAL ONCE
Status: COMPLETED | OUTPATIENT
Start: 2017-12-19 | End: 2017-12-19

## 2017-12-19 RX ORDER — QUETIAPINE FUMARATE 25 MG/1
50 TABLET, FILM COATED ORAL NIGHTLY
Status: DISCONTINUED | OUTPATIENT
Start: 2017-12-19 | End: 2017-12-20 | Stop reason: HOSPADM

## 2017-12-19 RX ORDER — QUETIAPINE FUMARATE 25 MG/1
25 TABLET, FILM COATED ORAL
Status: DISCONTINUED | OUTPATIENT
Start: 2017-12-20 | End: 2017-12-20 | Stop reason: HOSPADM

## 2017-12-19 RX ADMIN — AMIODARONE HYDROCHLORIDE 200 MG: 200 TABLET ORAL at 14:58

## 2017-12-19 RX ADMIN — AMIODARONE HYDROCHLORIDE 200 MG: 200 TABLET ORAL at 08:05

## 2017-12-19 RX ADMIN — LEVOTHYROXINE SODIUM 50 MCG: 50 TABLET ORAL at 08:04

## 2017-12-19 RX ADMIN — APIXABAN 2.5 MG: 2.5 TABLET, FILM COATED ORAL at 20:21

## 2017-12-19 RX ADMIN — FAMOTIDINE 10 MG: 20 TABLET, FILM COATED ORAL at 08:04

## 2017-12-19 RX ADMIN — CLOPIDOGREL 75 MG: 75 TABLET, FILM COATED ORAL at 08:05

## 2017-12-19 RX ADMIN — FAMOTIDINE 10 MG: 20 TABLET, FILM COATED ORAL at 20:21

## 2017-12-19 RX ADMIN — SIMVASTATIN 20 MG: 20 TABLET, FILM COATED ORAL at 20:20

## 2017-12-19 RX ADMIN — AMIODARONE HYDROCHLORIDE 200 MG: 200 TABLET ORAL at 20:21

## 2017-12-19 RX ADMIN — QUETIAPINE FUMARATE 50 MG: 25 TABLET ORAL at 20:25

## 2017-12-19 RX ADMIN — APIXABAN 2.5 MG: 2.5 TABLET, FILM COATED ORAL at 08:05

## 2017-12-19 RX ADMIN — QUETIAPINE FUMARATE 25 MG: 25 TABLET ORAL at 08:07

## 2017-12-19 RX ADMIN — ASPIRIN 81 MG: 81 TABLET, COATED ORAL at 08:05

## 2017-12-19 RX ADMIN — MULTIPLE VITAMINS W/ MINERALS TAB 1 TABLET: TAB at 08:04

## 2017-12-19 RX ADMIN — DIGOXIN 125 MCG: 0.12 TABLET ORAL at 08:05

## 2017-12-19 RX ADMIN — CIPROFLOXACIN 500 MG: 500 TABLET, FILM COATED ORAL at 08:05

## 2017-12-19 RX ADMIN — CIPROFLOXACIN 500 MG: 500 TABLET, FILM COATED ORAL at 20:21

## 2017-12-19 ASSESSMENT — PAIN SCALES - GENERAL
PAINLEVEL_OUTOF10: 0

## 2017-12-19 NOTE — PROGRESS NOTES
Occupational Therapy  Facility/Department: [facility]  Daily Treatment Note  NAME: Lele Key  : 1935  MRN: [id number]    Date of Service: 2017    Patient Diagnosis(es): There were no encounter diagnoses. has a past medical history of NSTEMI (non-ST elevated myocardial infarction) ([facility] [address] has a past surgical history that includes joint replacement; Cardiac catheterization (2017); Cataract removal (Bilateral); and Coronary artery bypass graft (N/A, 2017). Restrictions  Restrictions/Precautions  Restrictions/Precautions: General Precautions, Fall Risk, Surgical Protocols  Required Braces or Orthoses?: No  Position Activity Restriction  Sternal Precautions: No Pushing, No Pulling, 5# Lifting Restrictions  Other position/activity restrictions: CABG x4 /sternal prec, pt has become very confused which has caused a sitter/telesitter to be placed in room for safety reasons  Subjective   General  Patient assessed for rehabilitation services?: Yes  Family / Caregiver Present: No  Pain Assessment  Patient Currently in Pain: No  Vital Signs  Patient Currently in Pain: No   Orientation  Orientation  Overall Orientation Status: Impaired  Orientation Level: Oriented to person;Disoriented to place; Disoriented to time;Disoriented to situation (pt having hallucination on this date )  Objective    ADL  Feeding: Scoop assist;Beverage management;Verbal cueing; Increased time to complete;Bringing food to mouth assist;Setup;Minimal assistance; Moderate assistance (seated in recliner)  UE Dressing: Setup;Verbal cueing; Increased time to complete;Maximum assistance- seated on BSC  LE Dressing: Dependent/Total -footies seated on BS  Toileting: Dependent/Total (stood for backside mgt and brief mgt)  Additional Comments: pt is impulsive, easily distracted, not following commands, and is very confused on this date.  pt requires verbal/tactile/visual cues to follow through on a 1step simple command d/t pt's increased confusion. Balance  Sitting Balance: Contact guard assistance (seated on BSC, seated in recliner SBA)  Standing Balance: Minimal assistance (+2 hand held assistance)  Standing Balance  Sit to stand: Minimal assistance  Stand to sit: Minimal assistance     Transfers  Stand Step Transfers: Minimal assistance (+2- hand held assistance chair <>BSC)  Sit to stand: Minimal assistance  Stand to sit: Minimal assistance  Attendance  Participation: Active participation   Assessment   Performance deficits / Impairments: Decreased functional mobility ; Decreased endurance;Decreased ADL status; Decreased safe awareness;Decreased high-level IADLs;Decreased balance;Decreased cognition;Decreased strength;Decreased coordination;Decreased fine motor control  Prognosis: Fair  Decision Making: Low Complexity  Patient Education: orientation, ADLs, transfer safety- poor return  Barriers to Learning: increased confusion  REQUIRES OT FOLLOW UP: Yes  Activity Tolerance  Activity Tolerance: Treatment limited secondary to decreased cognition  Safety Devices  Safety Devices in place: Yes  Type of devices: All fall risk precautions in place;Call light within reach; Chair alarm in place; Left in chair;Nurse notified (telesitter and sitter at bedside)         Plan   Plan  Times per week: 4-5x/wk   Current Treatment Recommendations: Functional Mobility Training, Endurance Training, Safety Education & Training, Self-Care / ADL, Patient/Caregiver Education & Training, Equipment Evaluation, Education, & procurement     Goals  Short term goals  Time Frame for Short term goals: by discharge, pt will  Short term goal 1: demo I in UE ADL activites   Short term goal 2: demo MI/ I in LE ADL activites with AD as needed  Short term goal 3: demo understanding and I use of EC/WS, sternal precautions, proper pursed lipped breathing and fall prevention tech to assist with ADL/ functional activites  Short term goal 4: demo understanding and I use of safe

## 2017-12-19 NOTE — PROGRESS NOTES
Wendy [facility]    Progress Note    12/19/2017    9:14 AM    Name:   Mckayla Saldivar  MRN:     [id number]     Acct:      [de-identified]   Room:   [address] [place] [address] Day:  8  Admit Date:  12/11/2017  1:30 AM    PCP:   Toro Sargent DO  Code Status:  Full Code    Subjective:     C/C:  Chest pain  Interval History Status:  stable  Somnolent  Has been up to chair  No distress    Brief History:     Patient admitted through the emergency room with chest pain  Workup revealed left ventricular systolic dysfunction - ejection fraction 35%  She was found to have multivessel coronary artery disease  She is now status post coronary bypass grafting ×4 on 12/13  Postop course has included encephalopathy and UTI  BP has been low      Past Medical History:   has a past medical history of NSTEMI (non-ST elevated myocardial infarction) ([facility] [address] Social History:   reports that she has never smoked. She does not have any smokeless tobacco history on file. Family History: History reviewed. No pertinent family history. Medications:      Allergies:  No Known Allergies    Current Meds:   Scheduled Meds:    [START ON 12/20/2017] QUEtiapine  25 mg Oral QAM AC    QUEtiapine  50 mg Oral Nightly    ciprofloxacin  500 mg Oral 2 times per day    apixaban  2.5 mg Oral BID    levothyroxine  50 mcg Oral Daily    digoxin  125 mcg Oral Daily    insulin lispro  0-3 Units Subcutaneous Nightly    insulin lispro  0-6 Units Subcutaneous TID WC    famotidine  10 mg Oral BID    amiodarone  200 mg Oral TID    therapeutic multivitamin-minerals  1 tablet Oral Daily with breakfast    simvastatin  20 mg Oral Nightly    aspirin  81 mg Oral Daily    aspirin  150 mg Rectal Once    clopidogrel  75 mg Oral Daily     Continuous Infusions:    sodium chloride 50 mL/hr at 12/18/17 0940    dextrose       PRN Meds: bisacodyl, sodium chloride flush, calcium chloride IVPB, magnesium sulfate, potassium chloride, acetaminophen, fentanNYL, diphenhydrAMINE, magnesium hydroxide, ondansetron, metoclopramide, hydrALAZINE, glucose, dextrose, glucagon (rDNA), dextrose, traMADol      Review of Systems:     Review of Systems   Unable to perform ROS: Mental status change         Physical Examination:        Vitals:  BP (!) 109/93   Pulse 71   Temp 97.9 °F (36.6 °C) (Axillary)   Resp 12   Ht 5' (1.524 m)   Wt 194 lb 10.7 oz (88.3 kg)   SpO2 98%   BMI 38.02 kg/m²   Temp (24hrs), Av.4 °F (36.3 °C), Min:97 °F (36.1 °C), Max:97.9 °F (36.6 °C)    Recent Labs      17   1132  17   1631  17   2102  17   0707   POCGLU  141*  109*  150*  108*       Physical Exam   Constitutional: No distress. HENT:   Head: Normocephalic and atraumatic. Eyes: Conjunctivae are normal. No scleral icterus. Neck: Neck supple. No JVD present. Cardiovascular: Normal rate and regular rhythm. Telemetry reveals atrial flutter rate controlled   Pulmonary/Chest: Effort normal and breath sounds normal. No respiratory distress. She has no wheezes. She has no rales. Abdominal: Soft. Bowel sounds are normal. She exhibits no distension. There is no tenderness. There is no rebound. Musculoskeletal: She exhibits no edema or tenderness. Neurological: She exhibits normal muscle tone. Somnolent  Nonconversant  Not following commands   Skin: Skin is warm and dry. She is not diaphoretic. Her sternal and leg wounds are dressed dry and clean         Data:     I/O (24Hr):     Intake/Output Summary (Last 24 hours) at 17 0914  Last data filed at 17 0602   Gross per 24 hour   Intake           1464.4 ml   Output             1000 ml   Net            464.4 ml       Labs:    Hematology:  Recent Labs      17   0415   WBC  11.5*   HGB  10.0*   HCT  30.8*   PLT  See Reflexed IPF Result     Chemistry:  Recent Labs      17   0415  17   1455  17   0435  17   0530   NA   --   134*   -- tubes, as above.     2.  Mild vascular congestion and small pleural effusions, increased from the         Assessment:        Primary Problem  Principal Problem:    CAD, multiple vessel  Active Problems:    MI, acute, non ST segment elevation (HCC)    Pure hypercholesterolemia    Confusion    Anemia, blood loss    Enterococcus UTI    Atrial flutter (HCC)    Encephalopathy    Hyponatremia    NSTEMI (non-ST elevated myocardial infarction) ([facility] [address]    Elevated TSH    Postprocedural hypotension      Plan:        Optimize cardiopulmonary function  Cardiology evaluation  Atrial flutter - rate control  Neurology evaluation  Antibiotics per C&S - Cipro  CTS evaluation  Wound care  Correct electrolyte abnormalities  May need thyroid replacement  Monitor and control blood pressure    IP CONSULT TO CARDIOLOGY  IP CONSULT TO CARDIAC REHAB  IP CONSULT TO CARDIOTHORACIC SURGERY  IP CONSULT TO CARDIAC REHAB  IP CONSULT TO SPIRITUAL SERVICES  IP CONSULT TO DIETITIAN  IP CONSULT TO DIETITIAN  IP CONSULT TO NEUROLOGY  IP CONSULT TO IV TEAM    Cesario Juárez DO  12/19/2017  9:14 AM

## 2017-12-19 NOTE — PLAN OF CARE
Problem: Falls - Risk of  Goal: Absence of falls  Outcome: Ongoing  Call light with in reach, bed locked in lowest position when in use, staff sitter at bedside for patient safety. Nurse rounds hourly and prn. Will continue to monitor.

## 2017-12-19 NOTE — PROGRESS NOTES
Neurology Nurse Practitioner Progress Note      INTERVAL HISTORY: This is a [de-identified] y.o.  female admitted 12/11/2017 for chest pain. This is a follow-up neurology progress note. The patient was examined and the chart was reviewed. Discussed with the pt & RN. Per RN, pt had diarrhea last night so she wasn't able to sleep well even with Seroquel 25 mg. Guard & telesitter at the bed side. HPI: Pete Giles is a [de-identified] y.o. female with H/O CAD, CHF, HLD, who [facility] for chest pain. According to the medical records, pt developed chest pain that was described as \"pressure\" on her entire chest while at a grocery store. [facility]; she had elevated Troponin & initial EKG was normal. No PE found; had b/l mediastinal lymphadenopathy. Got transferred to [facility] with up-trending Trop. Cardiology & CTS on board. Echo - EF 35%. Pt underwent CABG x 4 on 12/13. Was started on ASA 81 mg QD, Plavix 75 mg QD & Zocor 20 mg HS. Pt self-extubated around 2AM (12/14). Apparently, it was noted that the pt was confused before CABG. Also found to have UTI; is on ATB. Neurology was consulted for continued confusion. Pt has been talking inappropriately, out of context, thought she had a bad car accident; was seeing her late .            [START ON 12/20/2017] QUEtiapine  25 mg Oral QAM AC    QUEtiapine  50 mg Oral Nightly    ciprofloxacin  500 mg Oral 2 times per day    apixaban  2.5 mg Oral BID    levothyroxine  50 mcg Oral Daily    digoxin  125 mcg Oral Daily    insulin lispro  0-3 Units Subcutaneous Nightly    insulin lispro  0-6 Units Subcutaneous TID WC    famotidine  10 mg Oral BID    amiodarone  200 mg Oral TID    therapeutic multivitamin-minerals  1 tablet Oral Daily with breakfast    simvastatin  20 mg Oral Nightly    aspirin  81 mg Oral Daily    aspirin  150 mg Rectal Once    clopidogrel  75 mg Oral Daily       Past Medical History:   Diagnosis Date    stenosis    ECHO (12/12/2017): EF 35%. Inferoseptal and inferior walls are akinetic from the mid to apex. Septal wall is hypokinetic. Grade I LV diastolic dysfunction. RA dilatation. Mild TR                        IMPRESSION & PLAN: [de-identified] y.o.  female admitted  Metabolic encephalopathy with urosepsis; delirious most probably due to low cerebral perfusion due to low EF, as per Dr. Romano Stutsman; is on Seroquel 25 mg BID. Pt had diarrhea last night so didn't sleep well last night. Today, she was very drowsy but alert, oriented to self only. Per RN, family reported that this is her baseline    UTI; is on ATB    Acute MI; NSTEMI; s/p CABG x 4 (12/13). A flutter; cardiology on board    Continue ASA 81 mg QD, Plavix 75 mg QD, & Zocor 20 mg HS    Continue PT/OT; she walked from bed to chair with assistance (12/18)    Comorbid conditions - CAD, CHF, HLD    Placement awaited.  Will follow

## 2017-12-19 NOTE — PROGRESS NOTES
Yes  Ambulation 1  Surface: level tile  Device: Arm and Arm  Assistance: Min. A x 1  Quality of Gait: quick santiago, slightly flexed posture   Distance: 100' x 2  Comments: Pt had two standing rest breaks. Stairs/Curb  Stairs?: No     Balance  Posture: Good  Sitting - Static: Good  Sitting - Dynamic: Good  Standing - Static: Good  Standing - Dynamic: Fair;+  Comments: RW standing balance  Exercises    Seated LE exercise program: Long Arc Quads,heel/toe raises, and marches. Reps: 20 x each with 2 lb wt on B LE's. Assessment     Body structures, Functions, Activity limitations: Decreased functional mobility ; Decreased endurance;Decreased strength  Assessment: pt amb 100' x 2, Pt appears to be pleasantly confused. Prognosis: Good  Patient Education: reviewed CR exs with pt verbalizing and demonstrating understanding  REQUIRES PT FOLLOW UP: Yes  Activity Tolerance  Activity Tolerance: Patient limited by endurance; Patient Tolerated treatment well  Activity Tolerance: increased confusion  PT Equipment Recommendations  Other: TBD               Goals  Short term goals  Time Frame for Short term goals: 20 visits  Short term goal 1: Pt will be I with bed mobility  Short term goal 2: Pt will be I with transfers  Short term goal 3: Pt will be I with amb 300' without AD  Short term goal 4: Pt will navigate 5 steps Renato with either or both rails.     Plan    Plan  Times per week: 7x/wk BID  Times per day: Twice a day  Current Treatment Recommendations: Strengthening, Transfer Training, Endurance Training, Gait Training, Balance Training, Functional Mobility Training, Stair training, Home Exercise Program, Safety Education & Training, Patient/Caregiver Education & Training, Equipment Evaluation, Education, & procurement  Safety Devices  Type of devices: Call light within reach, Left in chair, Patient at risk for falls, Gait belt, Nurse notified, All fall risk precautions in place, Chair alarm in place, Telesitter in

## 2017-12-19 NOTE — PROGRESS NOTES
Progress Note    Patient Name:  Chris Garner    :  2017 2:10 PM      SUBJECTIVE       Ms. Fahad Mccurdy  has no chest pain, shortness of breath, palpitations, nausea or vomiting      OBJECTIVE     Vital signs:    BP (!) 109/93   Pulse 71   Temp 97.9 °F (36.6 °C) (Axillary)   Resp 12   Ht 5' (1.524 m)   Wt 194 lb 10.7 oz (88.3 kg)   SpO2 98%   BMI 38.02 kg/m²  2 L/min      Admit Weight:  182 lb 15.7 oz (83 kg)    Last 3 weights: Wt Readings from Last 3 Encounters:   17 194 lb 10.7 oz (88.3 kg)       BMI: Body mass index is 38.02 kg/m². Input/Output:       Intake/Output Summary (Last 24 hours) at 17 1410  Last data filed at 17 0900   Gross per 24 hour   Intake           1614.4 ml   Output             1000 ml   Net            614.4 ml         Exam:     General appearance: awake and alert moves all ext   Lungs: no rhonchi, no wheezes, no rales  Heart: S1 and S2 no murmur  Abdomen: positive bowel sounds, no bruits, no masses  Extremities: warm and dry, no cyanosis, no clubbing        Laboratory Studies:     CBC: Recent Labs      17   0415   WBC  11.5*   HGB  10.0*   HCT  30.8*   MCV  93.6   PLT  See Reflexed IPF Result     BMP: Recent Labs      17   1455   NA  134*   K  5.2   CL  97*   CO2  26   BUN  18   CREATININE  0.74     PT/INR: No results for input(s): PROTIME, INR in the last 72 hours. APTT: No results for input(s): APTT in the last 72 hours. MAG:   Recent Labs      17   0415  17   0435  17   0530   MG  2.2  2.4  2.2     D Dimer: No results for input(s): DDIMER in the last 72 hours. Troponin  No results for input(s): TROPONINI in the last 72 hours. No results for input(s): TROPONINT in the last 72 hours. BNP No results for input(s): BNP in the last 72 hours. No results for input(s): PROBNP in the last 72 hours. Pulse Ox:  SpO2  Av.8 %  Min: 97 %  Max: 100 %  Supplemental O2: O2 Flow Rate (L/min): 2 L/min Current Meds:    [START ON 12/20/2017] QUEtiapine  25 mg Oral QAM AC    QUEtiapine  50 mg Oral Nightly    ciprofloxacin  500 mg Oral 2 times per day    apixaban  2.5 mg Oral BID    levothyroxine  50 mcg Oral Daily    digoxin  125 mcg Oral Daily    insulin lispro  0-3 Units Subcutaneous Nightly    insulin lispro  0-6 Units Subcutaneous TID WC    famotidine  10 mg Oral BID    amiodarone  200 mg Oral TID    therapeutic multivitamin-minerals  1 tablet Oral Daily with breakfast    simvastatin  20 mg Oral Nightly    aspirin  81 mg Oral Daily    aspirin  150 mg Rectal Once    clopidogrel  75 mg Oral Daily     Continuous Infusions:    sodium chloride 50 mL/hr at 12/18/17 0940    dextrose              ASSESSMENT     Principal Problem:    CAD, multiple vessel  Active Problems:    MI, acute, non ST segment elevation (HCC)    Pure hypercholesterolemia    Confusion    Anemia, blood loss    Enterococcus UTI    Atrial flutter (HCC)    Encephalopathy    Hyponatremia    NSTEMI (non-ST elevated myocardial infarction) ([facility] [address]    Elevated TSH    Postprocedural hypotension      PLAN       Plan per [facility]    Electronically signed by Washington Watson MD on 12/19/2017 at 2:10 PM

## 2017-12-19 NOTE — PROGRESS NOTES
[facility]  Progress Note    Surgeon: Hugo Sandoval  Procedure:  CABG X 4   POD#: 6  EF: 35%      Subjective:  Ms. Sheron Vargasek is confused, she does not know place, time, or situation. She requires a telesitter and bedside guard to keep her safe. Pain is controlled. OOBTC and ambulating. Last BMs - many loose stools overnight. Good appetite. Denies chest pain or shortness of breath. Plan of care reviewed and questions answered. Physical Exam  Vitals:  Vitals:    12/19/17 0706   BP: (!) 109/93   Pulse: 71   Resp: 12   Temp: 97.9 °F (36.6 °C)   SpO2: 98%     I/O last 3 completed shifts: In: 1714.4 [P.O.:620; I.V.:1094.4]  Out: 1200 [Urine:1200]  No intake/output data recorded. General: Alert, confused to place and time. Up in chair. No apparent distress. HEENT:  Normocephalic and atraumatic. PERRL. EOMI. Lips and oral mucosa moist and without lesions. Neck:  Supple. Trachea midline. Chest:  No abnormality. Equal and symmetric expansion with respiration. Lungs: Clear throughout, diminished bases, some crackles in bases  Cardiac:  Atrial flutter without murmurs, rubs or gallops. Abdomen:  Soft, non-tender, normoactive bowel sounds. No masses or organomegaly. Extremities:  2+bilateral lower leg edema. Intact pulses in all four extremities. Musculoskeletal:  Intact range of motion of peripheral joints. grossly normal  Neurologic:  Cranial nerves are grossly intact. Non-focal sensory deficits on exam.  Psychiatric: Mood and affect are appropriate. Surgical Wounds: Surgical incisions with clean, dry, intact dressings in place.  Pravena to MS, and left upper leg      Current Medications:    Current Facility-Administered Medications:     [START ON 12/20/2017] QUEtiapine (SEROQUEL) tablet 25 mg, 25 mg, Oral, QAM AC, Fish Sultana MD    QUEtiapine (SEROQUEL) tablet 50 mg, 50 mg, Oral, Nightly, Fish Sultana MD    ciprofloxacin (CIPRO) tablet 500 mg, 500 mg, Oral, 2 times per day, Andre Mccauley MD, 500 mg at 12/19/17 0805    apixaban (ELIQUIS) tablet 2.5 mg, 2.5 mg, Oral, BID, Andre Mccauley MD, 2.5 mg at 12/19/17 0805    0.9 % sodium chloride infusion, , Intravenous, Continuous, Andre Mccauley MD, Last Rate: 50 mL/hr at 12/18/17 0940    levothyroxine (SYNTHROID) tablet 50 mcg, 50 mcg, Oral, Daily, Andre Mccauley MD, 50 mcg at 12/19/17 0804    digoxin (LANOXIN) tablet 125 mcg, 125 mcg, Oral, Daily, Andre Mccauley MD, 125 mcg at 12/19/17 0805    bisacodyl (DULCOLAX) suppository 10 mg, 10 mg, Rectal, Daily PRN, Laura Kendrick, CNP    insulin lispro (HUMALOG) injection vial 0-3 Units, 0-3 Units, Subcutaneous, Nightly, Andre Mccauley MD, 1 Units at 12/15/17 2200    insulin lispro (HUMALOG) injection vial 0-6 Units, 0-6 Units, Subcutaneous, TID WC, Andre Mccauley MD, 1 Units at 12/16/17 1403    famotidine (PEPCID) tablet 10 mg, 10 mg, Oral, BID, Andre Mccauley MD, 10 mg at 12/19/17 0804    sodium chloride flush 0.9 % injection 10 mL, 10 mL, Intravenous, PRN, Andre Mccauley MD, 10 mL at 12/17/17 0829    calcium chloride 1 g in sodium chloride 0.9 % 100 mL IVPB, 1 g, Intravenous, PRN, Andre Mccauley MD, Stopped at 12/14/17 2206    magnesium sulfate 1 g in dextrose 5% 100 mL IVPB, 1 g, Intravenous, PRN, Andre Mccauley MD, Stopped at 12/14/17 2334    potassium chloride 20 mEq/50 mL IVPB (Central Line), 20 mEq, Intravenous, PRN, Andre Mccauley MD, Last Rate: 50 mL/hr at 12/13/17 1830, 20 mEq at 12/13/17 1830    acetaminophen (TYLENOL) tablet 650 mg, 650 mg, Oral, Q4H PRN, Andre Mccauley MD, 650 mg at 12/18/17 2013    fentaNYL (SUBLIMAZE) injection 50 mcg, 50 mcg, Intravenous, Q1H PRN, Andre Mccauley MD, 50 mcg at 12/13/17 2248    diphenhydrAMINE (BENADRYL) tablet 25 mg, 25 mg, Oral, Nightly PRN, Andre Mccauley MD, 25 mg at 12/17/17 2156    magnesium hydroxide (MILK OF MAGNESIA) 400 MG/5ML suspension 30 mL, 30 mL, Oral, Daily PRN, MD Tawnya Gil ondansetron (ZOFRAN) injection 4 mg, 4 mg, Intravenous, Q8H PRN, Andrew Granado MD, 4 mg at 12/14/17 0822    metoclopramide (REGLAN) injection 10 mg, 10 mg, Intravenous, Q6H PRN, Andrew Granado MD    amiodarone (CORDARONE) tablet 200 mg, 200 mg, Oral, TID, Andrew Granado MD, 200 mg at 12/19/17 0805    hydrALAZINE (APRESOLINE) injection 5 mg, 5 mg, Intravenous, Q5 Min PRN, Andrew Granado MD    therapeutic multivitamin-minerals 1 tablet, 1 tablet, Oral, Daily with breakfast, Andrew Granado MD, 1 tablet at 12/19/17 0804    simvastatin (ZOCOR) tablet 20 mg, 20 mg, Oral, Nightly, Andrew Granado MD, 20 mg at 12/18/17 2002    aspirin EC tablet 81 mg, 81 mg, Oral, Daily, Andrew Granado MD, 81 mg at 12/19/17 0805    aspirin suppository 150 mg, 150 mg, Rectal, Once, Andrew Granado MD    clopidogrel (PLAVIX) tablet 75 mg, 75 mg, Oral, Daily, Andrew Granado MD, 75 mg at 12/19/17 0805    glucose (GLUTOSE) 40 % oral gel 15 g, 15 g, Oral, PRN, Andrew Granado MD    dextrose 50 % solution 12.5 g, 12.5 g, Intravenous, PRN, Andrew Granado MD    glucagon (rDNA) injection 1 mg, 1 mg, Intramuscular, PRN, Andrew Granado MD    dextrose 5 % solution, 100 mL/hr, Intravenous, PRN, Andrew Granado MD    traMADol (ULTRAM) tablet 50 mg, 50 mg, Oral, Q6H PRN, Andrew Granado MD, 50 mg at 12/17/17 2015    Data:  CBC:   Recent Labs      12/17/17 0415   WBC  11.5*   HGB  10.0*   HCT  30.8*   MCV  93.6   PLT  See Reflexed IPF Result     BMP:   Recent Labs      12/17/17   0415  12/17/17   1455  12/18/17   0435  12/19/17   0530   NA   --   134*   --    --    K   --   5.2   --    --    CL   --   97*   --    --    CO2   --   26   --    --    BUN   --   18   --    --    CREATININE   --   0.74   --    --    MG  2.2   --   2.4  2.2     Accucheck Glucoses:   Recent Labs      12/18/17   0709  12/18/17   1132  12/18/17   1631  12/18/17   2102  12/19/17   0707   POCGLU  100  141*  109*  150*  108*     Cardiac Enzymes: No results for input(s): CKTOTAL, CKMB, CKMBINDEX, TROPONINI in the last 72 hours. PTT/PT/INR:   No results for input(s): PROTIME, INR in the last 72 hours. No results for input(s): APTT in the last 72 hours. Assessment & Plan:   1. S/p CABG x 4              POD 6  2. Atrial flutter rate controlled. on eliquis for [facility]    Digoxin and amiodarone  3. Confusion, probably sundowning              Seroquel 25mg in am, 50mg HS, patient did not sleep much last night  4. Enterococcus in Urine             Cipro continues  5. Hypothyroidism              levothyroxine daily     DVT ppx: SCD's while stationary  Patient is on BB, ASA, Statin therapy per protocol   Plan for discharge SNF when medically stable and less confused. The above recommendations including medications and orders were discussed and agreed upon with  the attending on service for the cardiothoracic surgery group today.      Frances Connolly CNP  Phone: [phone]

## 2017-12-20 VITALS
HEIGHT: 60 IN | BODY MASS INDEX: 38.13 KG/M2 | OXYGEN SATURATION: 100 % | TEMPERATURE: 97.5 F | SYSTOLIC BLOOD PRESSURE: 128 MMHG | DIASTOLIC BLOOD PRESSURE: 59 MMHG | HEART RATE: 79 BPM | RESPIRATION RATE: 17 BRPM | WEIGHT: 194.22 LBS

## 2017-12-20 LAB
ANION GAP SERPL CALCULATED.3IONS-SCNC: 12 MMOL/L (ref 9–17)
BUN BLDV-MCNC: 14 MG/DL (ref 8–23)
BUN/CREAT BLD: ABNORMAL (ref 9–20)
CALCIUM SERPL-MCNC: 8.1 MG/DL (ref 8.6–10.4)
CHLORIDE BLD-SCNC: 102 MMOL/L (ref 98–107)
CO2: 25 MMOL/L (ref 20–31)
CREAT SERPL-MCNC: 0.82 MG/DL (ref 0.5–0.9)
GFR AFRICAN AMERICAN: >60 ML/MIN
GFR NON-AFRICAN AMERICAN: >60 ML/MIN
GFR SERPL CREATININE-BSD FRML MDRD: ABNORMAL ML/MIN/{1.73_M2}
GFR SERPL CREATININE-BSD FRML MDRD: ABNORMAL ML/MIN/{1.73_M2}
GLUCOSE BLD-MCNC: 127 MG/DL (ref 65–105)
GLUCOSE BLD-MCNC: 127 MG/DL (ref 70–99)
GLUCOSE BLD-MCNC: 135 MG/DL (ref 65–105)
HCT VFR BLD CALC: 29.4 % (ref 36.3–47.1)
HEMOGLOBIN: 9.3 G/DL (ref 11.9–15.1)
MAGNESIUM: 2.2 MG/DL (ref 1.6–2.6)
MCH RBC QN AUTO: 30.6 PG (ref 25.2–33.5)
MCHC RBC AUTO-ENTMCNC: 31.6 G/DL (ref 28.4–34.8)
MCV RBC AUTO: 96.7 FL (ref 82.6–102.9)
PDW BLD-RTO: 16.1 % (ref 11.8–14.4)
PLATELET # BLD: 255 K/UL (ref 138–453)
PMV BLD AUTO: 10.1 FL (ref 8.1–13.5)
POTASSIUM SERPL-SCNC: 4.3 MMOL/L (ref 3.7–5.3)
RBC # BLD: 3.04 M/UL (ref 3.95–5.11)
SODIUM BLD-SCNC: 139 MMOL/L (ref 135–144)
WBC # BLD: 10.5 K/UL (ref 3.5–11.3)

## 2017-12-20 PROCEDURE — 97535 SELF CARE MNGMENT TRAINING: CPT

## 2017-12-20 PROCEDURE — 83735 ASSAY OF MAGNESIUM: CPT

## 2017-12-20 PROCEDURE — 82947 ASSAY GLUCOSE BLOOD QUANT: CPT

## 2017-12-20 PROCEDURE — 97110 THERAPEUTIC EXERCISES: CPT

## 2017-12-20 PROCEDURE — 80048 BASIC METABOLIC PNL TOTAL CA: CPT

## 2017-12-20 PROCEDURE — 6370000000 HC RX 637 (ALT 250 FOR IP): Performed by: THORACIC SURGERY (CARDIOTHORACIC VASCULAR SURGERY)

## 2017-12-20 PROCEDURE — 99239 HOSP IP/OBS DSCHRG MGMT >30: CPT | Performed by: INTERNAL MEDICINE

## 2017-12-20 PROCEDURE — 94762 N-INVAS EAR/PLS OXIMTRY CONT: CPT

## 2017-12-20 PROCEDURE — 85027 COMPLETE CBC AUTOMATED: CPT

## 2017-12-20 PROCEDURE — 36415 COLL VENOUS BLD VENIPUNCTURE: CPT

## 2017-12-20 PROCEDURE — 99024 POSTOP FOLLOW-UP VISIT: CPT | Performed by: PHYSICIAN ASSISTANT

## 2017-12-20 PROCEDURE — 99232 SBSQ HOSP IP/OBS MODERATE 35: CPT | Performed by: NURSE PRACTITIONER

## 2017-12-20 PROCEDURE — 97116 GAIT TRAINING THERAPY: CPT

## 2017-12-20 RX ORDER — NITROFURANTOIN 25; 75 MG/1; MG/1
100 CAPSULE ORAL 2 TIMES DAILY
Refills: 0 | DISCHARGE
Start: 2017-12-20 | End: 2017-12-25

## 2017-12-20 RX ORDER — DIGOXIN 125 MCG
125 TABLET ORAL DAILY
Qty: 30 TABLET | Refills: 3 | DISCHARGE
Start: 2017-12-21

## 2017-12-20 RX ORDER — CIPROFLOXACIN 500 MG/1
500 TABLET, FILM COATED ORAL EVERY 12 HOURS SCHEDULED
Status: DISCONTINUED | OUTPATIENT
Start: 2017-12-20 | End: 2017-12-20 | Stop reason: HOSPADM

## 2017-12-20 RX ORDER — AMIODARONE HYDROCHLORIDE 200 MG/1
200 TABLET ORAL 3 TIMES DAILY
Qty: 30 TABLET | Refills: 3 | DISCHARGE
Start: 2017-12-20

## 2017-12-20 RX ORDER — CLOPIDOGREL BISULFATE 75 MG/1
75 TABLET ORAL DAILY
Qty: 30 TABLET | Refills: 3 | DISCHARGE
Start: 2017-12-21

## 2017-12-20 RX ORDER — CIPROFLOXACIN 500 MG/1
500 TABLET, FILM COATED ORAL EVERY 12 HOURS SCHEDULED
Qty: 20 TABLET | Refills: 0 | DISCHARGE
Start: 2017-12-20 | End: 2017-12-20 | Stop reason: HOSPADM

## 2017-12-20 RX ORDER — CIPROFLOXACIN 500 MG/1
500 TABLET, FILM COATED ORAL EVERY 12 HOURS SCHEDULED
Status: DISCONTINUED | OUTPATIENT
Start: 2017-12-20 | End: 2017-12-20

## 2017-12-20 RX ORDER — M-VIT,TX,IRON,MINS/CALC/FOLIC 27MG-0.4MG
1 TABLET ORAL
DISCHARGE
Start: 2017-12-21

## 2017-12-20 RX ORDER — NITROFURANTOIN 25; 75 MG/1; MG/1
100 CAPSULE ORAL 2 TIMES DAILY
Refills: 0 | DISCHARGE
Start: 2017-12-20 | End: 2017-12-20

## 2017-12-20 RX ORDER — SIMVASTATIN 20 MG
20 TABLET ORAL NIGHTLY
Qty: 30 TABLET | Refills: 3 | DISCHARGE
Start: 2017-12-20

## 2017-12-20 RX ORDER — TRAMADOL HYDROCHLORIDE 50 MG/1
50 TABLET ORAL EVERY 6 HOURS PRN
Qty: 30 TABLET | Refills: 0 | Status: SHIPPED | OUTPATIENT
Start: 2017-12-20 | End: 2017-12-27

## 2017-12-20 RX ORDER — LEVOTHYROXINE SODIUM 0.05 MG/1
50 TABLET ORAL DAILY
Qty: 30 TABLET | Refills: 3 | DISCHARGE
Start: 2017-12-21

## 2017-12-20 RX ORDER — FAMOTIDINE 10 MG
10 TABLET ORAL 2 TIMES DAILY
Qty: 60 TABLET | Refills: 3 | DISCHARGE
Start: 2017-12-20

## 2017-12-20 RX ORDER — NITROFURANTOIN 25; 75 MG/1; MG/1
100 CAPSULE ORAL EVERY 12 HOURS SCHEDULED
Status: DISCONTINUED | OUTPATIENT
Start: 2017-12-20 | End: 2017-12-20

## 2017-12-20 RX ADMIN — ACETAMINOPHEN 650 MG: 325 TABLET ORAL at 04:22

## 2017-12-20 RX ADMIN — AMIODARONE HYDROCHLORIDE 200 MG: 200 TABLET ORAL at 16:20

## 2017-12-20 RX ADMIN — ASPIRIN 81 MG: 81 TABLET, COATED ORAL at 11:22

## 2017-12-20 RX ADMIN — DIGOXIN 125 MCG: 0.12 TABLET ORAL at 11:22

## 2017-12-20 RX ADMIN — LEVOTHYROXINE SODIUM 50 MCG: 50 TABLET ORAL at 11:22

## 2017-12-20 RX ADMIN — CLOPIDOGREL 75 MG: 75 TABLET, FILM COATED ORAL at 11:22

## 2017-12-20 RX ADMIN — APIXABAN 2.5 MG: 2.5 TABLET, FILM COATED ORAL at 11:22

## 2017-12-20 RX ADMIN — CIPROFLOXACIN 500 MG: 500 TABLET, FILM COATED ORAL at 11:22

## 2017-12-20 ASSESSMENT — PAIN DESCRIPTION - DESCRIPTORS: DESCRIPTORS: SORE

## 2017-12-20 ASSESSMENT — PAIN DESCRIPTION - LOCATION: LOCATION: THROAT

## 2017-12-20 ASSESSMENT — PAIN DESCRIPTION - ONSET: ONSET: PROGRESSIVE

## 2017-12-20 ASSESSMENT — PAIN SCALES - GENERAL: PAINLEVEL_OUTOF10: 10

## 2017-12-20 ASSESSMENT — PAIN DESCRIPTION - PAIN TYPE: TYPE: ACUTE PAIN

## 2017-12-20 ASSESSMENT — PAIN DESCRIPTION - FREQUENCY: FREQUENCY: CONTINUOUS

## 2017-12-20 ASSESSMENT — PAIN DESCRIPTION - PROGRESSION: CLINICAL_PROGRESSION: GRADUALLY WORSENING

## 2017-12-20 NOTE — PROGRESS NOTES
[facility]  Progress Note    12/20/2017 9:11 AM  Surgeon: Lupe Haas MD  Procedure:  CABG X 4   POD#: 7  EF: 35%    Subjective:  Ms. Sintia Garcia seen. overnight events noted. Pt was seen twice and sleeping both times. Was told by RN she went to sleep around 6am and was trying to get out of bed and confused whole night. +bm yesterday    Vital Signs: /65   Pulse 65   Temp 97.6 °F (36.4 °C) (Axillary)   Resp 16   Ht 5' (1.524 m)   Wt 194 lb 3.6 oz (88.1 kg)   SpO2 100%   BMI 37.93 kg/m²  O2 Flow Rate (L/min): 2 L/min   Admit Weight: Weight: 182 lb 15.7 oz (83 kg)   WEIGHTWeight: 194 lb 3.6 oz (88.1 kg)     Rhythm: A Flutter, rate 65    Labs:   CBC:   Recent Labs      12/20/17   0453   WBC  10.5   HGB  9.3*   HCT  29.4*   MCV  96.7   PLT  255     BMP:   Recent Labs      12/17/17   1455  12/20/17   0453   NA  134*  139   K  5.2  4.3   CL  97*  102   CO2  26  25   BUN  18  14   CREATININE  0.74  0.82     PT/INR: No results for input(s): PROTIME, INR in the last 72 hours. APTT: No results for input(s): APTT in the last 72 hours. I/O:  I/O last 3 completed shifts: In: 825 [P.O.:825]  Out: 1553 [Urine:1550; Stool:3]  Air Leak:  No air leak     Scheduled Meds:    QUEtiapine  25 mg Oral QAM AC    QUEtiapine  50 mg Oral Nightly    ciprofloxacin  500 mg Oral 2 times per day    apixaban  2.5 mg Oral BID    levothyroxine  50 mcg Oral Daily    digoxin  125 mcg Oral Daily    insulin lispro  0-3 Units Subcutaneous Nightly    insulin lispro  0-6 Units Subcutaneous TID WC    famotidine  10 mg Oral BID    amiodarone  200 mg Oral TID    therapeutic multivitamin-minerals  1 tablet Oral Daily with breakfast    simvastatin  20 mg Oral Nightly    aspirin  81 mg Oral Daily    aspirin  150 mg Rectal Once    clopidogrel  75 mg Oral Daily     Continuous Infusions:    sodium chloride 50 mL/hr at 12/18/17 0940    dextrose         Exam:   General: sleeping. Confused from RN report  Heart: A flutter. No murmurs, gallops, or rubs. ,  Lungs: clear to auscultation bilaterally Equal and symmetric expansion with respiration. Chest tubes out  Abdomen: soft, non tender, non distended, BSx4  Extremities: 2+ edema, intact pulses in all four extremities  Musculoskeletal:  Intact range of motion of peripheral joints. grossly normal  Neurologic:  Non-focal sensory deficits on exam.  Psychiatric: Mood and affect could not be obtained. Wounds: clean and dry, healing appropriately, dressing in place  Pravena to MS, and left upper leg      Assessment/Plan:   Patient Active Problem List   Diagnosis    MI, acute, non ST segment elevation ([facility] [address]    Pure hypercholesterolemia    Confusion    CAD, multiple vessel    Anemia, blood loss    Enterococcus UTI    Atrial flutter (HCC)    Encephalopathy    Hyponatremia    NSTEMI (non-ST elevated myocardial infarction) ([facility])    Elevated TSH    Postprocedural hypotension       1. S/p CABG x 4   POD 7  2. HD - Stable, A flutter rate controlled, on Eliquis, digoxin and Amio  3. Confusion - unchange, pt on Seroquel 25 Am and 50 pm. IM following. Will need Dx for SNF if pt goes on Seroquel  4. UTI - on cipro    DVT ppx:  SCD's while stationary  Patient is on BB,  ASA, Statin therapy per protocol   Plan for discharge  SNF when medically stable and less confused. The above recommendations including medications and orders were discussed and agreed upon with  the attending on service for the cardiothoracic surgery group today.     Electronically signed by MELODY Gandhi on 12/20/2017 at 9:11 AM

## 2017-12-20 NOTE — PROGRESS NOTES
17 0940    dextrose       PRN Meds: haloperidol lactate, bisacodyl, sodium chloride flush, calcium chloride IVPB, magnesium sulfate, potassium chloride, acetaminophen, fentanNYL, diphenhydrAMINE, magnesium hydroxide, ondansetron, metoclopramide, hydrALAZINE, glucose, dextrose, glucagon (rDNA), dextrose, traMADol      Review of Systems:     Review of Systems   Unable to perform ROS: Mental status change         Physical Examination:        Vitals:  BP (!) 128/59   Pulse 79   Temp 95.2 °F (35.1 °C) (Axillary)   Resp 17   Ht 5' (1.524 m)   Wt 194 lb 3.6 oz (88.1 kg)   SpO2 100%   BMI 37.93 kg/m²   Temp (24hrs), Av.4 °F (36.3 °C), Min:95.2 °F (35.1 °C), Max:98 °F (36.7 °C)    Recent Labs      17   1606  17   2038  17   0743  17   1130   POCGLU  137*  121*  135*  127*       Physical Exam   Constitutional: No distress. HENT:   Head: Normocephalic and atraumatic. Eyes: Conjunctivae are normal. No scleral icterus. Neck: Neck supple. No JVD present. Cardiovascular: Normal rate and regular rhythm. Telemetry reveals atrial flutter rate controlled   Pulmonary/Chest: Effort normal and breath sounds normal. No respiratory distress. She has no wheezes. She has no rales. Abdominal: Soft. Bowel sounds are normal. She exhibits no distension. There is no tenderness. There is no rebound. Musculoskeletal: She exhibits no edema or tenderness. Neurological: She exhibits normal muscle tone. Somnolent  Nonconversant  Not following commands   Skin: Skin is warm and dry. She is not diaphoretic. Her sternal and leg wounds are dressed dry and clean         Data:     I/O (24Hr):     Intake/Output Summary (Last 24 hours) at 17 1501  Last data filed at 17 0643   Gross per 24 hour   Intake              350 ml   Output             1553 ml   Net            -1203 ml       Labs:    Hematology:  Recent Labs      17   0453   WBC  10.5   HGB  9.3*   HCT  29.4*   PLT  255 SUSCEPTIBLE Final   penicillin  NOT REPORTED Final   tetracycline Resistant >=16 RESISTANT Final   tigecycline  NOT REPORTED Final   vancomycin Sensitive           Radiology:  Chest x-ray 12/19  Impression:        1.  Interval removal of support lines and tubes, as above. 2.  Mild vascular congestion and small pleural effusions, increased from the         Assessment:        Primary Problem  Principal Problem:    CAD, multiple vessel  Active Problems:    MI, acute, non ST segment elevation (HCC)    Pure hypercholesterolemia    Confusion    Anemia, blood loss    Enterococcus UTI    Atrial flutter (HCC)    Encephalopathy    Hyponatremia    NSTEMI (non-ST elevated myocardial infarction) (HCC)    Elevated TSH    Postprocedural hypotension      Plan:        Discharge planning - 35 minutes+  Optimize cardiopulmonary function  Cardiology evaluation  Atrial flutter - rate control, on Eliquis  Neurology evaluation  Antibiotics per C&S - Cipro for UTI  CTS evaluation  Wound care  Correct electrolyte abnormalities  Thyroid replacement  Monitor and control blood pressure  DC Seroquel for metabolic encephalopathy / agitation (improving)  Will discharge when arrangements complete and ok with other services.   Follow-up with PCP in one week, Trinity Elaine DO  Notify PCP of discharge   Plan to transfer patient to the [facility]    [address] [facility] TO [address] [place] [address] CONSULT TO CARDIAC REHAB  [facility]  IP CONSULT TO DIETITIAN  IP CONSULT TO DIETITIAN  IP CONSULT TO [address] [place] [address]  IP CONSULT TO  [address] [facility],   12/20/2017  3:01 PM

## 2017-12-20 NOTE — PROGRESS NOTES
Neurology Nurse Practitioner Progress Note      INTERVAL HISTORY: This is a [de-identified] y.o.  female admitted 12/11/2017 for chest pain. This is a follow-up neurology progress note. The patient was examined and the chart was reviewed. Discussed with the pt & RN. Per RN, pt was unable to sleep overnight. Per RN, pt was calm & stable. Guard was removed yesterday. Today, she was fully alert & oriented; daughter & grand-daughter at the bedside; they reported that this is her baseline. HPI: Eren Vidales is a [de-identified] y.o. female with H/O CAD, CHF, HLD, who was admitted as a transfer from [facility] ED on 12/11/2017 for chest pain. According to the medical records, pt developed chest pain that was described as \"pressure\" on her entire chest while at a grocery store. She drove to [facility] ED; she had elevated Troponin & initial EKG was normal. No PE found; had b/l mediastinal lymphadenopathy. Got transferred to [facility] with up-trending Trop. Cardiology & CTS on board. Echo - EF 35%. Pt underwent CABG x 4 on 12/13. Was started on ASA 81 mg QD, Plavix 75 mg QD & Zocor 20 mg HS. Pt self-extubated around 2AM (12/14). Apparently, it was noted that the pt was confused before CABG. Also found to have UTI; is on ATB. Neurology was consulted for continued confusion. Pt has been talking inappropriately, out of context, thought she had a bad car accident; was seeing her late .            QUEtiapine  25 mg Oral QAM AC    QUEtiapine  50 mg Oral Nightly    ciprofloxacin  500 mg Oral 2 times per day    apixaban  2.5 mg Oral BID    levothyroxine  50 mcg Oral Daily    digoxin  125 mcg Oral Daily    insulin lispro  0-3 Units Subcutaneous Nightly    insulin lispro  0-6 Units Subcutaneous TID WC    famotidine  10 mg Oral BID    amiodarone  200 mg Oral TID    therapeutic multivitamin-minerals  1 tablet Oral Daily with breakfast    simvastatin  20 mg Oral Nightly    aspirin  81 mg Oral Daily    aspirin  150 mg Rectal Once    CAROTID DOPPLER (12/11/2017): Mild ICAs stenosis    ECHO (12/12/2017): EF 35%. Inferoseptal and inferior walls are akinetic from the mid to apex. Septal wall is hypokinetic. Grade I LV diastolic dysfunction. RA dilatation. Mild TR                        IMPRESSION & PLAN: [de-identified] y.o.  female admitted  Metabolic encephalopathy with urosepsis; delirious most probably due to low cerebral perfusion due to low EF, as per Dr. Christiana Devlin; cardiology team increased the dose of Seroquel from 25 mg BID to 25 + 50 mg; however, pt was unable to sleep overnight. Per RN, pt was calm & stable. Guard was removed yesterday. Today, she was fully alert & oriented; daughter & grand-daughter at the bedside; they reported that this is her baseline    UTI; is on ATB    Acute MI; NSTEMI; s/p CABG x 4 (12/13). A flutter; cardiology on board    Continue ASA 81 mg QD, Plavix 75 mg QD, & Zocor 20 mg HS    Continue PT/OT; she walked 300' with RW    Comorbid conditions - CAD, CHF, HLD    Placement awaited.  Will follow while she is here

## 2017-12-20 NOTE — PROGRESS NOTES
Physical Therapy  Facility/Department: [facility]  Daily Treatment Note  NAME: Bertha Trevino  : 1935  MRN: [id number]    Date of Service: 2017    Patient Diagnosis(es):   Patient Active Problem List    Diagnosis Date Noted    Hyponatremia 2017    Elevated TSH 2017    Postprocedural hypotension 2017    Atrial flutter ([address] 2017    Encephalopathy     Enterococcus UTI 12/15/2017    Anemia, blood loss 2017    CAD, multiple vessel 2017    Confusion 2017    Pure hypercholesterolemia 2017    NSTEMI (non-ST elevated myocardial infarction) ([address] 2017    MI, acute, non ST segment elevation ([address] 12/10/2017       Past Medical History:   Diagnosis Date    NSTEMI (non-ST elevated myocardial infarction) ([address] 2017     Past Surgical History:   Procedure Laterality Date    CARDIAC CATHETERIZATION  2017    DR Natalie Mcdonald CATARACT REMOVAL Bilateral     CORONARY ARTERY BYPASS GRAFT N/A 2017    CABG CORONARY ARTERY BYPASS x 4 OFF PUMP, SWAN, MAL performed by Lisa Montesinos MD at [address]      total right knee       Restrictions  Restrictions/Precautions  Restrictions/Precautions: General Precautions, Fall Risk, Surgical Protocols  Required Braces or Orthoses?: No  Position Activity Restriction  Sternal Precautions: No Pushing, No Pulling, 5# Lifting Restrictions  Other position/activity restrictions: CABG x4 /sternal prec, pt has become very confused which has caused a sitter/telesitter to be placed in room for safety reasons  Subjective    Pt sitting up in her bed upon arrival. Pt's daughter and granddaughter are present.   Pt has no c/o pain  Orientation    Overall Orientation Status: Impaired  Orientation Level: Oriented to person;Oriented to situation  Objective    Bed Mobility  Supine to sit: CGA  Scooting: CGA    Transfers  Sit to Stand:CGA off bed  Stand to sit: CGA into chair  Comment: vc's for safety  Ambulation  Ambulation?: Yes  Ambulation 1  Surface: level tile  Device: RW  Assistance: CGA  Quality of Gait: quick santiago, slightly flexed posture   Distance: 300' x 1 with 3 standing rest breaks d/t SOB. Stairs/Curb  Stairs?: No   Balance  Posture: Good  Sitting - Static: Good  Sitting - Dynamic: Good  Standing - Static: Good  Standing - Dynamic: Fair;+  Comments: RW standing balance  Exercises    Seated LE exercise program: Long Arc Quads,heel/toe raises, and marches. Reps: 20 x each with 2 lb wt on B LE's. Assessment     Body structures, Functions, Activity limitations: Decreased functional mobility ; Decreased endurance;Decreased strength  Assessment: pt amb 300' x 1 with 3 standing rest breaks d/t SOB. Pt appears to be more alert today. Prognosis: Good  Patient Education: reviewed CR exs with pt verbalizing and demonstrating understanding  REQUIRES PT FOLLOW UP: Yes  Activity Tolerance  Activity Tolerance: Patient limited by endurance; Patient Tolerated treatment well  Activity Tolerance: increased confusion  PT Equipment Recommendations  Other: TBD               Goals  Short term goals  Time Frame for Short term goals: 20 visits  Short term goal 1: Pt will be I with bed mobility  Short term goal 2: Pt will be I with transfers  Short term goal 3: Pt will be I with amb 300' without AD  Short term goal 4: Pt will navigate 5 steps Renato with either or both rails.     Plan    Plan  Times per week: 7x/wk BID  Times per day: Twice a day  Current Treatment Recommendations: Strengthening, Transfer Training, Endurance Training, Gait Training, Balance Training, Functional Mobility Training, Stair training, Home Exercise Program, Safety Education & Training, Patient/Caregiver Education & Training, Equipment Evaluation, Education, & procurement  Safety Devices  Type of devices: Call light within reach, Left in alarmed chair, Patient at risk for falls, Gait belt, Nurse notified, All fall risk precautions in place, Chair alarm in place, Telesitter in use  Restraints  Initially in place: No     Therapy Time   Individual Concurrent Group Co-treatment   Time In   1100         Time Out   1130         Minutes  [address]

## 2017-12-20 NOTE — PROGRESS NOTES
Occupational Therapy  Facility/Department: [facility] 1  Daily Treatment Note  NAME: Lopez Campbell  : 1935  MRN: [id number]    Date of Service: 2017    Patient Diagnosis(es): There were no encounter diagnoses. has a past medical history of NSTEMI (non-ST elevated myocardial infarction) ([facility], [address] has a past surgical history that includes joint replacement; Cardiac catheterization (2017); Cataract removal (Bilateral); and Coronary artery bypass graft (N/A, 2017). Restrictions  Restrictions/Precautions  Restrictions/Precautions: General Precautions, Fall Risk, Surgical Protocols  Required Braces or Orthoses?: No  Position Activity Restriction  Sternal Precautions: No Pushing, No Pulling, 5# Lifting Restrictions  Other position/activity restrictions: CABG x4 /sternal prec. Subjective   General  Patient assessed for rehabilitation services?: Yes  Family / Caregiver Present: No  Pain Assessment  Patient Currently in Pain: Denies  Vital Signs  Patient Currently in Pain: Denies   Orientation  Orientation  Overall Orientation Status: Within Functional Limits  Objective    ADL  Grooming: Setup  UE Bathing: Setup;Contact guard assistance (standing at sink.)  LE Bathing: Setup;Minimal assistance (pericare standing at sink.)  UE Dressing: Minimal assistance  LE Dressing: Maximum assistance  Additional Comments: Pt impulsive, easily distracted with verbal cues needed to stay on task and for safety. Balance  Sitting Balance: Supervision  Standing Balance: Contact guard assistance  Standing Balance  Time: 20 minutes  Activity: Functional mobility to/from bathroom using RW, ADL care standing at sink with seated rest needed x2.   Sit to stand: Contact guard assistance  Stand to sit: Contact guard assistance  Functional Mobility  Functional - Mobility Device: Rolling Walker  Activity: To/from bathroom  Assist Level: Contact guard assistance  Transfers  Sit to stand: Contact guard assistance  Stand to sit: Contact guard assistance  Cognition  Overall Cognitive Status: [facility]  Cognition Comment: Pt requiring verbal cues for walker safety. Pt displays moments of impulsive behavior, not waiting till therapist is ready to start next task. Assessment   Performance deficits / Impairments: Decreased functional mobility ; Decreased endurance;Decreased ADL status; Decreased safe awareness;Decreased high-level IADLs;Decreased balance;Decreased fine motor control  Prognosis: Fair  Patient Education: OT POC, EC/WS, walker safety with fair return. REQUIRES OT FOLLOW UP: Yes  Activity Tolerance  Activity Tolerance: Patient Tolerated treatment well  Safety Devices  Safety Devices in place: Yes  Type of devices: Call light within reach; Chair alarm in place; Patient at risk for falls; Left in chair;Nurse notified          Plan   Plan  Times per week: 4-5x/wk   Current Treatment Recommendations: Functional Mobility Training, Endurance Training, Safety Education & Training, Self-Care / ADL, Patient/Caregiver Education & Training, Equipment Evaluation, Education, & procurement      Goals  Short term goals  Time Frame for Short term goals: by discharge, pt will  Short term goal 1: demo I in UE ADL activites   Short term goal 2: demo MI/ I in LE ADL activites with AD as needed  Short term goal 3: demo understanding and I use of EC/WS, sternal precautions, proper pursed lipped breathing and fall prevention tech to assist with ADL/ functional activites  Short term goal 4: demo understanding and I use of safe transfer tech during functional activites with use of RW  Short term goal 5: demo increased activity tolerance of 20+ min to assist with ADL/ functional activites        Therapy Time   Individual Concurrent Group Co-treatment   Time In          Time Out 1358         Minutes 48            Pt seated in chair upon therapist arrival. Pleasant and agreeable to therapy.  Pt completed sit/stand transfer, functional mobility to/from bathroom

## 2017-12-21 NOTE — PLAN OF CARE
DISCHARGE INSTRUCTIONS    Discharge instruction given: to pt and spoke with her family at bedside, questions answered    Reviewed Your Care After Open Heart Surgery Book  Supplies needed  Activity instructions  Incisional Care  Weight and temperature   Pain Medication  Kyree Hose  Incentive spirometer   Smoking Cessation  When to call your Doctor  Follow up appointments  Call Primary Doctor if blood sugars consistently above 130 or symptoms of high or low blood sugars  Call Primary Care Doctor if feelings of depression persist  Reinforced pt to participate in outpt cardiac rehabilitation program  Cardiac rehab referral faxed with face sheet, op note and H&P faxed to [facility] incisional prevena removed open to air with staples noted small blister in distal portion surgi-bra on with folded 4x4 dressing between breasts    Chest tube sites x 2 open to air with steri strips    Lt leg svg near knee prevena removed very large are inner thigh up to groin ecchymotic soft red drainage small amt for incision. Site where prevena was placed ecchymotic on the back of knee at the bend two large intact blisters attempted to place prevena per Alejandra OLIVER at bedside unable to get a good seal decided on drsg changes bid and prn for rehab. 4x4 fluffs wrapped with kerlix.     Rash noted on trunk Dr. General Trotter and IM services notified antibiotic changed

## 2017-12-22 NOTE — DISCHARGE SUMMARY
[id number] [facility]  Discharge Summary    Patient's Name/Date of Birth: Eren Vidales / 1935 (66 y.o.)    Discharge Date: December 22, 2017   Discharge Physician: Ramonita Bhardwaj MD  Discharge Unit: [address] [place] [address]    Reason For Admission: No chief complaint on file. HPI: Ms. Luis F Jack is a [de-identified]y.o. year-old  female who was transferred from [facility] with elevated troponins and further workup yesterday. Pt denies any significant past medical history. Per pt she had substernal chest pain after returning from grocery shopping. It was nonradiating, lasting for few hours which made her go to Er. Associated with back pain, vomiting and diarrhea. Denies any diaphoresis, SOB, fatigue, dizziness syncope, palpitations. Denies any similar CP in the past. She was given morphine and nitro drip at [facility] with relief of symptoms. Had elevated trops and no ST elevation on EKG. Her CT chest was negative for PE per history. She also has c/o pedal edema on and off for long time for which she takes \"water pill\". Pt's daughter present in the room and states she has been confused for some time. She had cardiac cath and was found to have MVD. She was referred for consideration of CABG. Brief Review of Hospital Course:   She underwent surgery as planned on 12/13/17 CABG X 4, LIMA to LAD, SVG to D,  SVG to Ramus and OM in seq, EVH, Intra Op US Guidance MAL by anesthesia. Pt self extubated overnight postop, was put on bipap for couple of hours and maintained O2 sats at 100% on NC afterwards. Pt received one unit PRBC post op for ABLA. She had UTI for which she received Nitrofurantoin which was switched to Cipro for low GFR which was switched to Ampicillin as pt developed macular rash. Pt was in A. Flutter with rate controlled in 70s, was on Eliquis, Amio, digoxin and coreg. No cardioversion was advised by cardiology.  She was bit more confused after surgery for which she was on telesitter, guard and seroquel for couple of days. She was back to her baseline before discharged. She had developed rash due to Cipro prior to discharged and was switched to Ampicillin. Her [place] was taken off on day of discharged both on MS and leg incision. She had small water blister near her leg incision due pravena. ROS:   CONSTITUTIONAL:  Denies: fever, chills, diaphoresis, fatigue  Respiratory: negative for cough, dyspnea on exertion, pleurisy/chest pain, shortness of breath, sputum and wheezing  Cardiovascular: negative for chest pain, chest pressure/discomfort, exertional chest pressure/discomfort, fatigue, orthopnea, palpitations, paroxysmal nocturnal dyspnea and syncope  Gastrointestinal: negative for abdominal pain, constipation and diarrhea  Genitourinary:negative for dysuria and frequency  Hematologic/lymphatic: negative  Musculoskeletal:negative  Neurological: negative for dizziness, gait problems, headaches, seizures, vertigo and weakness  Endocrine: negative    Physical Exam:  Vitals:    12/20/17 1530   BP:    Pulse:    Resp:    Temp: 97.5 °F (36.4 °C)   SpO2:      Weight: Weight: 194 lb 3.6 oz (88.1 kg)    Weight: 182 lb 15.7 oz (83 kg)    I/O last 3 completed shifts: In: 350 [P.O.:350]  Out: 1553 [Urine:1550; Stool:3]    General: Alert and Oriented x3. Resting in bed. No apparent distress. HEENT:  Normocephalic and atraumatic. PERRL. EOMI. Lips and oral mucosa moist and without lesions. Neck:  Supple. Trachea midline. Chest:  No abnormality. Equal and symmetric expansion with respiration. Lungs:  clear to auscultation. No fremitus. Cardiac:  A.flutter rate and rhythm without murmurs, rubs or gallops. Abdomen:  Soft, non-tender,normoactive bowel sounds. No masses or organomegaly. Extremities:  No cyanosis, clubbing. Intact pulses in all four extremities. trace bilateral lower leg edema. Musculoskeletal:  Intact range of motion of peripheral joints. 5/5 muscular strength.   Lymphatic:  No cervical or supraclavicular

## 2017-12-26 ENCOUNTER — TELEPHONE (OUTPATIENT)
Dept: CARDIOTHORACIC SURGERY | Age: 82
End: 2017-12-26

## 2017-12-27 ENCOUNTER — OFFICE VISIT (OUTPATIENT)
Dept: CARDIOTHORACIC SURGERY | Age: 82
End: 2017-12-27

## 2017-12-27 VITALS
RESPIRATION RATE: 18 BRPM | HEIGHT: 60 IN | HEART RATE: 65 BPM | TEMPERATURE: 97.1 F | DIASTOLIC BLOOD PRESSURE: 64 MMHG | WEIGHT: 182 LBS | SYSTOLIC BLOOD PRESSURE: 131 MMHG | BODY MASS INDEX: 35.73 KG/M2 | OXYGEN SATURATION: 97 %

## 2017-12-27 DIAGNOSIS — I10 HYPERTENSION, UNSPECIFIED TYPE: Primary | ICD-10-CM

## 2017-12-27 DIAGNOSIS — Z95.1 S/P CABG X 4: ICD-10-CM

## 2017-12-27 PROCEDURE — 99024 POSTOP FOLLOW-UP VISIT: CPT | Performed by: NURSE PRACTITIONER

## 2017-12-27 RX ORDER — LISINOPRIL 5 MG/1
5 TABLET ORAL DAILY
Qty: 30 TABLET | Refills: 3 | Status: SHIPPED | OUTPATIENT
Start: 2017-12-27

## 2017-12-27 RX ORDER — FERROUS SULFATE 325(65) MG
325 TABLET ORAL
COMMUNITY

## 2017-12-27 RX ORDER — OXYCODONE HYDROCHLORIDE AND ACETAMINOPHEN 5; 325 MG/1; MG/1
1 TABLET ORAL EVERY 4 HOURS PRN
COMMUNITY

## 2017-12-27 RX ORDER — FUROSEMIDE 40 MG/1
40 TABLET ORAL DAILY
COMMUNITY

## 2017-12-27 NOTE — PROGRESS NOTES
[facility]  Office Visit      Subjective:  Ms. Getachew Braxton is a [de-identified] y.o. female s/p CABG x 4 on 12/13/17. she feels well today. Pain is controlled. Denies chest pain or shortness of breath. Plan of care reviewed and questions answered. She is recovering at [facility] and she is doing well there. Her family is with her today and they feel she is ready to go home. They have family support available this week to help her. Patient states she has drainage from left leg incision, denies fever, chills, redness, warmth. Physical Exam  Vitals:  Vitals:    12/27/17 1117   BP: 131/64   Pulse: 65   Resp: 18   Temp: 97.1 °F (36.2 °C)   SpO2: 97%       General: Alert and Oriented x3. Ambulatory. No apparent distress. HEENT:  Normocephalic and atraumatic. Neck:  Supple. Trachea midline. Chest:  No abnormality. Equal and symmetric expansion with respiration. Lungs:  Clear to auscultation. Cardiac:  Regular rate and rhythm without murmurs, rubs or gallops. Abdomen:  Soft, non-tender, normoactive bowel sounds. No masses or organomegaly. Extremities:  No edema. Intact pulses in all four extremities. Musculoskeletal:  Intact range of motion of peripheral joints. Normal muscular strength. Psychiatric: Mood and affect are appropriate. Surgical Wounds: MS incision with staples, healing well, no drainage, Left lower leg incision with staples, serosang drainage. 2+ edema noted in left leg also.       Current Medications:    Current Outpatient Prescriptions:     ferrous sulfate 325 (65 Fe) MG tablet, Take 325 mg by mouth daily (with breakfast), Disp: , Rfl:     furosemide (LASIX) 40 MG tablet, Take 40 mg by mouth daily, Disp: , Rfl:     oxyCODONE-acetaminophen (PERCOCET) 5-325 MG per tablet, Take 1 tablet by mouth every 4 hours as needed for Pain ., Disp: , Rfl:     lisinopril (PRINIVIL;ZESTRIL) 5 MG tablet, Take 1 tablet by mouth daily, Disp: 30 tablet, Rfl: 3    traMADol (ULTRAM) 50 MG tablet, Take 1 tablet by mouth every 6 hours as needed for Pain ., Disp: 30 tablet, Rfl: 0    apixaban (ELIQUIS) 2.5 MG TABS tablet, Take 1 tablet by mouth 2 times daily, Disp: 60 tablet, Rfl:     amiodarone (CORDARONE) 200 MG tablet, Take 1 tablet by mouth 3 times daily, Disp: 30 tablet, Rfl: 3    simvastatin (ZOCOR) 20 MG tablet, Take 1 tablet by mouth nightly, Disp: 30 tablet, Rfl: 3    digoxin (LANOXIN) 125 MCG tablet, Take 1 tablet by mouth daily, Disp: 30 tablet, Rfl: 3    clopidogrel (PLAVIX) 75 MG tablet, Take 1 tablet by mouth daily, Disp: 30 tablet, Rfl: 3    Multiple Vitamins-Minerals (THERAPEUTIC MULTIVITAMIN-MINERALS) tablet, Take 1 tablet by mouth daily (with breakfast), Disp: , Rfl:     levothyroxine (SYNTHROID) 50 MCG tablet, Take 1 tablet by mouth Daily, Disp: 30 tablet, Rfl: 3    famotidine (PEPCID) 10 MG tablet, Take 1 tablet by mouth 2 times daily, Disp: 60 tablet, Rfl: 3    Past Surgical History:   Procedure Laterality Date    CARDIAC CATHETERIZATION  12/12/2017    DR Yajaira Juares CATARACT REMOVAL Bilateral     CORONARY ARTERY BYPASS GRAFT N/A 12/13/2017    CABG CORONARY ARTERY BYPASS x 4 OFF PUMP, SWAN, ADRIEL performed by Parker Walton MD at [address]      total right knee       Social Hx: reports that she has never smoked. She has never used smokeless tobacco.    Assessment & Plan:   1. Ok to discharge home from facility from our standpoint. She will need set up for home health RN and possibly home health aide services. Wear TREY hose during day, off at night to help with swelling. 2. Start lisinopril 5mg daily. Staples removed from [address] incision, healing well. Left leg, every other staple removed. Keep dressing dry, change PRN. 3. Follow up with Dr. Ludmila Garner in 1 week for staple removal      The above recommendations including medications and orders were discussed and agreed upon with .     Harry Colin, [address]   Office Phone: [phone]

## 2018-01-02 ENCOUNTER — OFFICE VISIT (OUTPATIENT)
Dept: CARDIOTHORACIC SURGERY | Age: 83
End: 2018-01-02

## 2018-01-02 ENCOUNTER — HOSPITAL ENCOUNTER (OUTPATIENT)
Age: 83
Setting detail: SPECIMEN
Discharge: HOME | End: 2018-01-02
Payer: MEDICARE

## 2018-01-02 VITALS
SYSTOLIC BLOOD PRESSURE: 142 MMHG | OXYGEN SATURATION: 96 % | TEMPERATURE: 97.6 F | DIASTOLIC BLOOD PRESSURE: 76 MMHG | WEIGHT: 179 LBS | BODY MASS INDEX: 35.14 KG/M2 | RESPIRATION RATE: 18 BRPM | HEART RATE: 89 BPM | HEIGHT: 60 IN

## 2018-01-02 DIAGNOSIS — T81.31XA DEHISCENCE OF INCISION, INITIAL ENCOUNTER: Primary | ICD-10-CM

## 2018-01-02 LAB
CULTURE: NORMAL
DIRECT EXAM: NORMAL
Lab: NORMAL
SPECIMEN DESCRIPTION: NORMAL
STATUS: NORMAL

## 2018-01-02 PROCEDURE — 99024 POSTOP FOLLOW-UP VISIT: CPT | Performed by: NURSE PRACTITIONER

## 2018-01-02 RX ORDER — CEPHALEXIN 500 MG/1
500 CAPSULE ORAL 2 TIMES DAILY
Qty: 30 CAPSULE | Refills: 0 | Status: SHIPPED | OUTPATIENT
Start: 2018-01-02 | End: 2018-01-03

## 2018-01-02 NOTE — PROGRESS NOTES
[facility]  Office Visit      Subjective:  Ms. Randy Vivas is a [de-identified] y.o. female s/p CABG x 4 on 12/13/17. she feels well today. Pain is controlled. Denies chest pain or shortness of breath. Plan of care reviewed and questions answered. Her home care RN was out today and was concerned at the amount of drainage and redness to her left leg SVG incision. The redness was worse than last visit and it is now warm to touch. Serosanguinous drainage present. Patient states they change the dressing at least once a day, but sometimes the drainage \"runs down my leg. \"      Physical Exam  Vitals:  Vitals:    01/02/18 1242   BP: (!) 142/76   Pulse: 89   Resp: 18   Temp: 97.6 °F (36.4 °C)   SpO2: 96%       General: Alert and Oriented x3. Ambulatory. No apparent distress. HEENT:  Normocephalic and atraumatic. Neck:  Supple. Trachea midline. Chest:  No abnormality. Equal and symmetric expansion with respiration. Lungs:  Clear to auscultation. Cardiac:  Regular rate and rhythm without murmurs, rubs or gallops. Abdomen:  Soft, non-tender, normoactive bowel sounds. No masses or organomegaly. Extremities:  2+ edema bilateral lower legs, patient wearing compression stockings. Intact pulses in all four extremities. Musculoskeletal:  Intact range of motion of peripheral joints. Normal muscular strength. Psychiatric: Mood and affect are appropriate. Surgical Wounds: MS incision healing well. Left lower leg incision with staples. Serosang drainage noted. Yellow eschar tissue around wound edges, Staples removed, wound about 3.5cm  Deep. Wound culture obtained. Packed with Wet to dry NS. Wound measurements 5.5cm long  (whole incision length), 2mm wide, 3.75cm deep.     Current Medications:    Current Outpatient Prescriptions:     cephALEXin (KEFLEX) 500 MG capsule, Take 1 capsule by mouth 2 times daily, Disp: 30 capsule, Rfl: 0    ferrous sulfate 325 (65 Fe) MG tablet, Take 325 mg by mouth daily (with

## 2018-01-03 ENCOUNTER — TELEPHONE (OUTPATIENT)
Dept: CARDIOTHORACIC SURGERY | Age: 83
End: 2018-01-03

## 2018-01-03 RX ORDER — DOXYCYCLINE HYCLATE 100 MG
100 TABLET ORAL 2 TIMES DAILY
Qty: 20 TABLET | Refills: 0 | Status: SHIPPED | OUTPATIENT
Start: 2018-01-03 | End: 2018-01-13

## 2018-01-04 LAB
CULTURE: ABNORMAL
CULTURE: ABNORMAL
DIRECT EXAM: ABNORMAL
DIRECT EXAM: ABNORMAL
Lab: ABNORMAL
ORGANISM: ABNORMAL
SPECIMEN DESCRIPTION: ABNORMAL
STATUS: ABNORMAL

## 2018-01-08 ENCOUNTER — TELEPHONE (OUTPATIENT)
Dept: CARDIOTHORACIC SURGERY | Age: 83
End: 2018-01-08

## 2018-01-08 NOTE — TELEPHONE ENCOUNTER
[id number] [facility] called with updates on the pt's needed wound vac. The pt did not receive her wound vac on Saturday as planned and as confirmed by [facility]. Bryson called the on call Doctor- Kirk Kasper on saturday; he suggested wet to dry dressing changes BID until the pt receives her wound vac. Nurse also called [employer] (the supply company) inquiring about the delay. [facility] states they do not have our clinic notes. Notes have been faxed to [facility] and [facility] confirmations attached.

## 2018-01-09 ENCOUNTER — TELEPHONE (OUTPATIENT)
Dept: CARDIOTHORACIC SURGERY | Age: 83
End: 2018-01-09

## 2018-01-10 ENCOUNTER — OFFICE VISIT (OUTPATIENT)
Dept: CARDIOTHORACIC SURGERY | Age: 83
End: 2018-01-10

## 2018-01-10 VITALS
SYSTOLIC BLOOD PRESSURE: 125 MMHG | HEIGHT: 60 IN | WEIGHT: 173 LBS | TEMPERATURE: 96.6 F | BODY MASS INDEX: 33.96 KG/M2 | OXYGEN SATURATION: 97 % | DIASTOLIC BLOOD PRESSURE: 61 MMHG | HEART RATE: 80 BPM | RESPIRATION RATE: 18 BRPM

## 2018-01-10 DIAGNOSIS — Z09 FOLLOW UP: Primary | ICD-10-CM

## 2018-01-10 PROCEDURE — 99024 POSTOP FOLLOW-UP VISIT: CPT | Performed by: THORACIC SURGERY (CARDIOTHORACIC VASCULAR SURGERY)

## 2018-01-31 ENCOUNTER — OFFICE VISIT (OUTPATIENT)
Dept: CARDIOTHORACIC SURGERY | Age: 83
End: 2018-01-31

## 2018-01-31 VITALS
RESPIRATION RATE: 18 BRPM | TEMPERATURE: 97.1 F | DIASTOLIC BLOOD PRESSURE: 79 MMHG | SYSTOLIC BLOOD PRESSURE: 139 MMHG | WEIGHT: 166 LBS | BODY MASS INDEX: 32.59 KG/M2 | OXYGEN SATURATION: 96 % | HEIGHT: 60 IN | HEART RATE: 102 BPM

## 2018-01-31 DIAGNOSIS — Z09 FOLLOW UP: Primary | ICD-10-CM

## 2018-01-31 PROCEDURE — 99024 POSTOP FOLLOW-UP VISIT: CPT | Performed by: THORACIC SURGERY (CARDIOTHORACIC VASCULAR SURGERY)

## 2018-02-14 ENCOUNTER — OFFICE VISIT (OUTPATIENT)
Dept: CARDIOTHORACIC SURGERY | Age: 83
End: 2018-02-14

## 2018-02-14 VITALS
DIASTOLIC BLOOD PRESSURE: 81 MMHG | TEMPERATURE: 96.3 F | WEIGHT: 153 LBS | HEART RATE: 89 BPM | SYSTOLIC BLOOD PRESSURE: 131 MMHG | BODY MASS INDEX: 29.88 KG/M2 | OXYGEN SATURATION: 96 %

## 2018-02-14 DIAGNOSIS — Z09 FOLLOW UP: ICD-10-CM

## 2018-02-14 DIAGNOSIS — T81.31XA DEHISCENCE OF INCISION, INITIAL ENCOUNTER: ICD-10-CM

## 2018-02-14 DIAGNOSIS — Z95.1 S/P CABG X 4: Primary | ICD-10-CM

## 2018-02-14 PROCEDURE — 99024 POSTOP FOLLOW-UP VISIT: CPT | Performed by: NURSE PRACTITIONER

## 2018-02-14 RX ORDER — MIRTAZAPINE 15 MG/1
TABLET, FILM COATED ORAL
COMMUNITY
Start: 2018-01-26

## 2018-02-14 RX ORDER — TEMAZEPAM 7.5 MG/1
CAPSULE ORAL
COMMUNITY
Start: 2018-01-18

## 2018-02-15 NOTE — PROGRESS NOTES
Leg wound healing with good granulation tissue. Will discontinue wound vac and start wet to dry dressings. RTO 2 weeks.     Edith Estrada MD
No

## (undated) DEVICE — CONNECTOR TBNG WHT PLAS SUCT STR 5IN1 LTWT W/ M CONN

## (undated) DEVICE — SCANLAN® VASCU-STATT® II PLUS S-USE BULLDOG CLAMP W/FIRM PRESS GENTLE-JAW™- MINI STRAIGHT (GREEN), CLAMPING PRESSURE 20-25 G (1/STERILE PKG): Brand: SCANLAN® VASCU-STATT® II PLUS S-USE BULLDOG CLAMP W/FIRM PRESS GENTLE-JAW™

## (undated) DEVICE — RETRACTOR SURG INSRT SUT HLD OCTOBASE

## (undated) DEVICE — Z DISCONTINUED USE 2275676 GLOVE SURG SZ 65 L12IN FNGR THK87MIL DK GRN LTX FREE ISOLEX

## (undated) DEVICE — DECANTER FLD 9IN ST BG FOR ASEP TRNSF OF FLD

## (undated) DEVICE — TOWEL,OR,DSP,ST,BLUE,DLX,XR,4/PK,20PK/CS: Brand: MEDLINE

## (undated) DEVICE — GLOVE SURG SZ 8 L12IN FNGR THK87MIL WHT LTX FREE

## (undated) DEVICE — SUTURE PROL SZ 5-0 L30IN NONABSORBABLE BLU L13MM RB-2 1/2 8710H

## (undated) DEVICE — GEL US 20GM NONIRRITATING OVERWRAPPED FILE PCH TRNSMIT

## (undated) DEVICE — CATHETER THOR L21IN DIA32FR R ANG HYDRAGLIDE SFT RADPQ STRP

## (undated) DEVICE — SUTURE SZ 7 L18IN NONABSORBABLE SIL CCS L48MM 1/2 CIR STRNM M655G

## (undated) DEVICE — WAX SURG 2.5GM HEMSTAT BNE BEESWAX PARAFFIN ISO PALMITATE

## (undated) DEVICE — KIT BLWR MISTER 5P 15L W/ TBNG SET IRRIG MIST TO IMPROVE

## (undated) DEVICE — SUTURE PERMAHAND SZ 2-0 L18IN NONABSORBABLE BLK L26MM SH C012D

## (undated) DEVICE — PROTECTOR ULN NRV PUR FOAM HK LOOP STRP ANATOMICALLY

## (undated) DEVICE — PRESSURE MONITORING LINES 4FT. (122CM) M/F: Brand: PRESSURE MONITORING LINES

## (undated) DEVICE — SUTURE PROL SZ 6-0 L18IN NONABSORBABLE BLU RB-2 L13MM 1/2 8714H

## (undated) DEVICE — PUNCH AORT DIA4MM LNG HNDL

## (undated) DEVICE — SHUNT SURG INTRALUMINAL 2 MM SIL STRL FLO-THRU LF DISP

## (undated) DEVICE — PACK PROCEDURE SURG OPN HRT ADD ON

## (undated) DEVICE — SUTURE VCRL + SZ 3-0 L27IN ABSRB WHT CT-1 1/2 CIR VCP258H

## (undated) DEVICE — SYSTEM SEAL 4.3MM PROX HEMSTAT HEARTSTRING III

## (undated) DEVICE — BNDG,ELSTC,MATRIX,STRL,4"X5YD,LF,HOOK&LP: Brand: MEDLINE

## (undated) DEVICE — GLOVE SURG SZ 75 L12IN FNGR THK87MIL DK GRN LTX FREE ISOLEX

## (undated) DEVICE — SUTURE VCRL + 1 L27IN ABSRB UD CT-1 L36MM 1/2 CIR TAPR PNT VCP261H

## (undated) DEVICE — GAUZE,SPONGE,4"X4",16PLY,XRAY,STRL,LF: Brand: MEDLINE

## (undated) DEVICE — SUTURE PROL SZ 7-0 L24IN NONABSORBABLE BLU L8MM BV175-6 3/8 8735H

## (undated) DEVICE — Z DISCONTINUED APPLICATOR SURG PREP 0.35OZ 2% CHG 70% ISO ALC W/ HI LT

## (undated) DEVICE — CONNECTOR TBNG Y 6IN 1 PLAS LTWT

## (undated) DEVICE — INSUFFLATION TUBING SET WITH FILTER, FUNNEL CONNECTOR AND LUER LOCK: Brand: JOSNOE MEDICAL INC

## (undated) DEVICE — CANNULA PERF L2IN BLNT TIP 2MM VES CLR RADPQ BODY FEM LUER

## (undated) DEVICE — PREVENA INCISION MANAGEMENT SYSTEM- PEEL & PLACE DRESSING: Brand: PREVENA™ PEEL & PLACE™

## (undated) DEVICE — Z DISCONTINUED NO SUB IDED DRAIN SURG 2 COLL PT TB FOR ATS BG OASIS

## (undated) DEVICE — PACK PROCEDURE SURG OPN HRT

## (undated) DEVICE — CHLORAPREP 26ML ORANGE

## (undated) DEVICE — BLADE OPHTH ORNG GRINDLESS SMALLER ALTERNATIVE TO NO15 GEN

## (undated) DEVICE — STRIP,CLOSURE,WOUND,MEDI-STRIP,1/2X4: Brand: MEDLINE

## (undated) DEVICE — 3M™ STERI-STRIP™ COMPOUND BENZOIN TINCTURE 40 BAGS/CARTON 4 CARTONS/CASE C1544: Brand: 3M™ STERI-STRIP™

## (undated) DEVICE — DRAPE IRRIG FLD WRM W44XL66IN W/ AORN STD PRTBL INTRATEMP

## (undated) DEVICE — GLOVE SURG SZ 75 L12IN FNGR THK87MIL WHT LTX FREE

## (undated) DEVICE — SUTURE VCRL + SZ 4-0 L18IN ABSRB UD L19MM PS-2 3/8 CIR PRIM VCP496H

## (undated) DEVICE — HEADREST NEURO DONUT 7 IN

## (undated) DEVICE — AGENT HEMSTAT W2XL14IN OXIDIZED REGENERATED CELOS ABSRB FOR

## (undated) DEVICE — STABILIZER TISS CANSTR TBNG EVOLUTION AS OCTPS

## (undated) DEVICE — Device

## (undated) DEVICE — BLADE SAW W6.35XL32MM STRNM CUT STRNOTMY

## (undated) DEVICE — SUTURE PERMAHAND SZ 4-0 L30IN NONABSORBABLE BLK L17MM RB-1 K871H

## (undated) DEVICE — BNDG,ELSTC,MATRIX,STRL,6"X5YD,LF,HOOK&LP: Brand: MEDLINE

## (undated) DEVICE — TUBE CHST L20IN OD32FR SIL TAPR CONN TIP STR SFT RADPQ

## (undated) DEVICE — GLOVE SURG SZ 7 L12IN FNGR THK87MIL WHT LTX FREE

## (undated) DEVICE — SHUNT SURG INTRALUMINAL 1.50 MM SIL STRL FLO-THRU LF DISP

## (undated) DEVICE — GOWN,AURORA,NONREINFORCED,LARGE: Brand: MEDLINE

## (undated) DEVICE — Device: Brand: VIRTUOSAPH PLUS WITH RADIAL INDICATION

## (undated) DEVICE — SUTURE PROL SZ 8-0 L18IN NONABSORBABLE BLU L8MM BV175-6 3/8 8740H

## (undated) DEVICE — TRAY CATH 16FR COMPLT CARE URIN M TEMP SENS STATLOK STBL

## (undated) DEVICE — SUTURE PROL SZ 8-0 L18IN NONABSORBABLE BLU L6.5MM BV130-5 8730H

## (undated) DEVICE — GLOVE SURG SZ 65 L12IN FNGR THK87MIL WHT LTX FREE

## (undated) DEVICE — PLATELET CONCENTRATION PACK PROC 14-20 ML SMARTPREP 2

## (undated) DEVICE — BLADE OPHTH D5MM 15DEG GRN W/ RND KNURLED HNDL MICRO-SHARP

## (undated) DEVICE — BAG ENDOSCP TRNSPRT CLR RECLOSABLE 24INX20IN